# Patient Record
Sex: FEMALE | Race: BLACK OR AFRICAN AMERICAN | NOT HISPANIC OR LATINO | Employment: FULL TIME | URBAN - METROPOLITAN AREA
[De-identification: names, ages, dates, MRNs, and addresses within clinical notes are randomized per-mention and may not be internally consistent; named-entity substitution may affect disease eponyms.]

---

## 2023-05-20 LAB — CHORIOGONADOTROPIN (MIU/ML) IN SER/PLAS: 2845 IU/L

## 2023-05-26 ENCOUNTER — OFFICE VISIT (OUTPATIENT)
Dept: PRIMARY CARE | Facility: CLINIC | Age: 28
End: 2023-05-26
Payer: COMMERCIAL

## 2023-05-26 ENCOUNTER — LAB (OUTPATIENT)
Dept: LAB | Facility: LAB | Age: 28
End: 2023-05-26
Payer: COMMERCIAL

## 2023-05-26 VITALS
WEIGHT: 222.8 LBS | BODY MASS INDEX: 39.47 KG/M2 | OXYGEN SATURATION: 100 % | TEMPERATURE: 98 F | SYSTOLIC BLOOD PRESSURE: 140 MMHG | DIASTOLIC BLOOD PRESSURE: 90 MMHG | HEART RATE: 76 BPM

## 2023-05-26 DIAGNOSIS — R76.11 POSITIVE TB TEST: ICD-10-CM

## 2023-05-26 DIAGNOSIS — Z3A.01 LESS THAN 8 WEEKS GESTATION OF PREGNANCY (HHS-HCC): Primary | ICD-10-CM

## 2023-05-26 PROCEDURE — 86481 TB AG RESPONSE T-CELL SUSP: CPT

## 2023-05-26 PROCEDURE — 99213 OFFICE O/P EST LOW 20 MIN: CPT | Performed by: STUDENT IN AN ORGANIZED HEALTH CARE EDUCATION/TRAINING PROGRAM

## 2023-05-26 PROCEDURE — 36415 COLL VENOUS BLD VENIPUNCTURE: CPT

## 2023-05-26 PROCEDURE — 1036F TOBACCO NON-USER: CPT | Performed by: STUDENT IN AN ORGANIZED HEALTH CARE EDUCATION/TRAINING PROGRAM

## 2023-05-26 ASSESSMENT — PAIN SCALES - GENERAL: PAINLEVEL: 0-NO PAIN

## 2023-05-26 NOTE — PROGRESS NOTES
Subjective   Patient ID: Vita Oconnor is a 27 y.o. female who presents for Routine Prenatal Visit and Follow-up (Pt states she tested positive for TB on May 5th and was told she needed a chest xray to return to work.).    HPI     #Pregnancy  -From OB note:    HPI:   : 26 yo  presents with spotting and cramping. Hcg 833. TVUS with likely GS. Repeat quat in 48hrs.     Blood Type: O pos; Rhogam not indcated     Ultrasound:   IMPRESSION:  1. Intrauterine probable gestational sac, with measurements  corresponding to a 5 week gestation, without fetal pole or yolk sac  evident. Serial serum beta HCG and follow up with pelvic ultrasound  is recommended.  2. Right-sided corpus luteum cyst. Left-sided simple cysts.    Impression: Likely early IUP     Contraceptive plan: unk     HCG Levels:   Date HCG Plan    hcg 833 repeat in 48 hours   [address] [facility] US vs     Contact Details:   Date Details   : Added to excel spreadsheet. -LM  : Pt doing well. Asx. Will get quant tomorrow. Undesired pregnancy, plans to go to  if quant rising appropriately. - LM  : Pt doing well. Asx. Not bleeding. Still wants to go to . RN tasked to debra'd US. Instructed pt that if she has US at  before US at , she wouldn't need both. F/u US debra/plan . -LM  : Patient scheduled for U/S,  at [id number]. SEBASTIÁN Tate RN    -B quant increasing   -Pt states that she is planning to go through  for elective termination, is connected with resources, [place] for CXR    #TB testing  - States she was applying for a job and had a positive TB blood test, has a prior history of + PPD test, was never treated, never had a chest xray,  -Pt denies any productive cough or night sweats, denies recent travel, has never gone to intermediate, denies IVDU, denies illnesses or immunocompromised state        Review of Systems    Pt denies any current chest pain, SOB, nausea or abdominal pain    Objective   /90 (BP Location:  Right arm, Patient Position: Sitting, BP Cuff Size: Adult)   Pulse 76   Temp 36.7 °C (98 °F) (Temporal)   Wt 101 kg (222 lb 12.8 oz)   SpO2 100%   BMI 39.47 kg/m²     Physical Exam    Constitutional: Well developed, awake, alert, oriented x3  Head and Face: NCAT  Eyes: Normal external exam, clear sclera bl  ENT: Normal external inspection of ears and nose. Oropharynx normal.  Cardiovascular: RRR, S1/S2, no murmurs, rubs, or gallops, radial pulses +2, no edema of extremities  Pulmonary: CTAB, no respiratory distress.  Abdomen: +BS, soft, non-tender, nondistended, no guarding or rebound, no masses noted  Neuro: A&O x3, CN II-XII grossly intact, no focal neuro deficits   MSK: No joint swelling, normal movements of all extremities. Range of motion- normal.  Skin- No lesions, contusions, or erythema.  Psychiatric: Judgment intact. Appropriate mood and behavior     Assessment/Plan   Diagnoses and all orders for this visit:  Less than 8 weeks gestation of pregnancy  -     prenatal vitamin iron-folic (Prenatal) 27 mg iron- 800 mcg tablet; Take 1 tablet by mouth once daily.  -Pt current plan is for elective termination, is connected with  group, will prescribed prenatals every day until termination complete or pt changes her mind, pt agreeable to plan  Positive TB test  -     T-Spot TB; Future  -     Referral to Infectious Disease; Future  -Unclear which TB test positive since none available in EMR, will repeat test and refer to ID for treatment/workup given concurrent pregnancy    Discussed with:  Dr. Kirby  Return in : 1-2 months for FUV    Portions of this note were generated using digital voice recognition software, and may contain grammatical errors       Solo Jaeger MD  PGY-3, Family Medicine

## 2023-05-30 LAB
NIL(NEG) CONTROL SPOT COUNT: ABNORMAL
PANEL A SPOT COUNT: 3
PANEL B SPOT COUNT: 10
POS CONTROL SPOT COUNT: ABNORMAL
T-SPOT. TB INTERPRETATION: ABNORMAL

## 2023-06-02 NOTE — PROGRESS NOTES
I reviewed with the resident the medical history and the resident’s findings on physical examination.  I discussed with the resident the patient’s diagnosis and concur with the treatment plan as documented in the resident note.     Follow-up asap if blood test results positive.  Mildred Kirby MD

## 2023-06-10 DIAGNOSIS — Z3A.01 LESS THAN 8 WEEKS GESTATION OF PREGNANCY (HHS-HCC): ICD-10-CM

## 2023-06-12 RX ORDER — B-COMPLEX WITH VITAMIN C
TABLET ORAL
Qty: 90 TABLET | Refills: 1 | Status: SHIPPED | OUTPATIENT
Start: 2023-06-12 | End: 2023-09-16 | Stop reason: ALTCHOICE

## 2023-06-16 ENCOUNTER — APPOINTMENT (OUTPATIENT)
Dept: PRIMARY CARE | Facility: CLINIC | Age: 28
End: 2023-06-16
Payer: COMMERCIAL

## 2023-07-29 ENCOUNTER — HOSPITAL ENCOUNTER (OUTPATIENT)
Dept: DATA CONVERSION | Facility: HOSPITAL | Age: 28
End: 2023-07-29

## 2023-09-07 LAB
THYROTROPIN (MIU/L) IN SER/PLAS BY DETECTION LIMIT <= 0.05 MIU/L: 0.21 MIU/L (ref 0.44–3.98)
THYROXINE (T4) FREE (NG/DL) IN SER/PLAS: 1.17 NG/DL (ref 0.78–1.48)

## 2023-09-08 ENCOUNTER — OFFICE VISIT (OUTPATIENT)
Dept: PRIMARY CARE | Facility: CLINIC | Age: 28
End: 2023-09-08
Payer: COMMERCIAL

## 2023-09-08 VITALS
BODY MASS INDEX: 35.82 KG/M2 | DIASTOLIC BLOOD PRESSURE: 100 MMHG | WEIGHT: 215 LBS | SYSTOLIC BLOOD PRESSURE: 138 MMHG | HEIGHT: 65 IN | HEART RATE: 74 BPM

## 2023-09-08 DIAGNOSIS — Z76.89 ESTABLISHING CARE WITH NEW DOCTOR, ENCOUNTER FOR: Primary | ICD-10-CM

## 2023-09-08 DIAGNOSIS — R01.1 MURMUR, HEART: ICD-10-CM

## 2023-09-08 DIAGNOSIS — I10 HYPERTENSION, UNSPECIFIED TYPE: ICD-10-CM

## 2023-09-08 DIAGNOSIS — F41.9 ANXIETY: ICD-10-CM

## 2023-09-08 LAB
ALANINE AMINOTRANSFERASE (SGPT) (U/L) IN SER/PLAS: 7 U/L (ref 7–45)
ALBUMIN (G/DL) IN SER/PLAS: 4.1 G/DL (ref 3.4–5)
ALKALINE PHOSPHATASE (U/L) IN SER/PLAS: 72 U/L (ref 33–110)
ANION GAP IN SER/PLAS: 14 MMOL/L (ref 10–20)
ASPARTATE AMINOTRANSFERASE (SGOT) (U/L) IN SER/PLAS: 9 U/L (ref 9–39)
BILIRUBIN TOTAL (MG/DL) IN SER/PLAS: 1.3 MG/DL (ref 0–1.2)
CALCIUM (MG/DL) IN SER/PLAS: 9.5 MG/DL (ref 8.6–10.6)
CARBON DIOXIDE, TOTAL (MMOL/L) IN SER/PLAS: 27 MMOL/L (ref 21–32)
CHLORIDE (MMOL/L) IN SER/PLAS: 104 MMOL/L (ref 98–107)
CHOLESTEROL (MG/DL) IN SER/PLAS: 129 MG/DL (ref 0–199)
CHOLESTEROL IN HDL (MG/DL) IN SER/PLAS: 39.6 MG/DL
CHOLESTEROL/HDL RATIO: 3.3
CREATININE (MG/DL) IN SER/PLAS: 0.74 MG/DL (ref 0.5–1.05)
ERYTHROCYTE DISTRIBUTION WIDTH (RATIO) BY AUTOMATED COUNT: 14 % (ref 11.5–14.5)
ERYTHROCYTE MEAN CORPUSCULAR HEMOGLOBIN CONCENTRATION (G/DL) BY AUTOMATED: 31.4 G/DL (ref 32–36)
ERYTHROCYTE MEAN CORPUSCULAR VOLUME (FL) BY AUTOMATED COUNT: 92 FL (ref 80–100)
ERYTHROCYTES (10*6/UL) IN BLOOD BY AUTOMATED COUNT: 4.43 X10E12/L (ref 4–5.2)
GFR FEMALE: >90 ML/MIN/1.73M2
GLUCOSE (MG/DL) IN SER/PLAS: 85 MG/DL (ref 74–99)
HEMATOCRIT (%) IN BLOOD BY AUTOMATED COUNT: 40.7 % (ref 36–46)
HEMOGLOBIN (G/DL) IN BLOOD: 12.8 G/DL (ref 12–16)
LDL: 79 MG/DL (ref 0–99)
LEUKOCYTES (10*3/UL) IN BLOOD BY AUTOMATED COUNT: 8.2 X10E9/L (ref 4.4–11.3)
NRBC (PER 100 WBCS) BY AUTOMATED COUNT: 0 /100 WBC (ref 0–0)
PLATELETS (10*3/UL) IN BLOOD AUTOMATED COUNT: 351 X10E9/L (ref 150–450)
POTASSIUM (MMOL/L) IN SER/PLAS: 3.5 MMOL/L (ref 3.5–5.3)
PROTEIN TOTAL: 7 G/DL (ref 6.4–8.2)
SODIUM (MMOL/L) IN SER/PLAS: 141 MMOL/L (ref 136–145)
TRIGLYCERIDE (MG/DL) IN SER/PLAS: 54 MG/DL (ref 0–149)
UREA NITROGEN (MG/DL) IN SER/PLAS: 5 MG/DL (ref 6–23)
VLDL: 11 MG/DL (ref 0–40)

## 2023-09-08 PROCEDURE — 3075F SYST BP GE 130 - 139MM HG: CPT

## 2023-09-08 PROCEDURE — 80061 LIPID PANEL: CPT

## 2023-09-08 PROCEDURE — 85027 COMPLETE CBC AUTOMATED: CPT

## 2023-09-08 PROCEDURE — 80053 COMPREHEN METABOLIC PANEL: CPT

## 2023-09-08 PROCEDURE — 1036F TOBACCO NON-USER: CPT

## 2023-09-08 PROCEDURE — 99204 OFFICE O/P NEW MOD 45 MIN: CPT

## 2023-09-08 PROCEDURE — 3080F DIAST BP >= 90 MM HG: CPT

## 2023-09-08 RX ORDER — AMLODIPINE BESYLATE 5 MG/1
5 TABLET ORAL DAILY
Qty: 30 TABLET | Refills: 1 | Status: SHIPPED | OUTPATIENT
Start: 2023-09-08 | End: 2023-10-18 | Stop reason: SDUPTHER

## 2023-09-08 NOTE — PATIENT INSTRUCTIONS
Please start taking your Paroxetine 10 mg for anxiety and depression.   Cut your trazodone 50 mg in half if they are making you groggy and try that for insomnia.  For blood pressure we started you on a medication called amlodipine 5 mg.  You will need to take this medication once daily.  Please take your blood pressure in the morning and keep a log until you see us.  An order for an echocardiogram was placed for this new murmur  Please get this completed before your next appointment.    You will need to follow up with us at the end of September for a follow up appointment.  If you notice any worsening signs or symptoms or have any thoughts of suicide or homicide please go to the nearest emergency department.

## 2023-09-08 NOTE — PROGRESS NOTES
"Subjective   Patient ID: Vita Oconnor is a 28 y.o. female who presents for Establish Care. Patient stated that in July she had an  then she stated that she immediately started having signs and symptoms of anxiety and depression such as insomnia, loss of interest, excessive worrying, palpitations, and fatigue. Patient stated that she was started on paroxetine and trazodone. She took trazodone but did not like the way it made her feel. She stated that she has not started her paroxetine. She also wanted a referral to a new therapist preferably a woman. Patient also stated that recently her blood pressure has been high and she has been having headaches and feeling lightheaded. Patient also stated that she went to the emergency department yesterday and she was told the she has a murmur and she was never told that before.      Review of Systems  All other systems have been reviewed and are negative.    Visit Vitals  BP (!) 138/100   Pulse 74   Ht 1.651 m (5' 5\")   Wt 97.5 kg (215 lb)   BMI 35.78 kg/m²   OB Status Pregnant   Smoking Status Never   BSA 2.11 m²       Objective   Physical Exam  General: Alert and oriented. Appears well-nourished and in no acute distress.  Eyes: PERRLA. EOMI.  Head/neck: Normocephalic. Supple.  Lymphatics: No cervical lymphadenopathy.  Respiratory/Thorax: Clear to auscultation bilaterally. No wheezing.   Cardiovascular: Regular rate and rhythm. No murmurs.  Gastrointestinal: Soft, nontender, nondistended. +BS   Musculoskeletal: ROM intact. No joint swelling. Normal strength   Extremities: Warm and well perfused. No peripheral edema.  Neurological: No gross neurologic deficits.   Psychological: Appropriate mood and affect.   Skin: No visible rashes or lesions.     Assessment/Plan   Vita was seen today for establish care.  Diagnoses and all orders for this visit:  Establishing care with new doctor, encounter for  -     Lipid Panel; Future  -     CBC; Future  -     Comprehensive Metabolic " Panel; Future  -     Comprehensive Metabolic Panel  -     CBC  -     Lipid Panel  Anxiety  -     Referral to Psychology; Future  Postpartum depression  -     Referral to Psychology; Future  Murmur, heart  -     Transthoracic Echo (TTE) Complete; Future  Hypertension, unspecified type  -     amLODIPine (Norvasc) 5 mg tablet; Take 1 tablet (5 mg) by mouth once daily.  Pt is a 28 yo F who was seen today for post partum anxiety and depression. Patient was encouraged to started her anti-depressants and try to take a half of the trazodone which is 25 mg to see if she is able to tolerate it for insomnia. Patient was given a referral for psychology and a list of therapists. Patient was also started on amlodipine 5 mg for HTN. An echocardiogram was ordered for the new murmur.    NATIVIDAD-7: 14- Moderate Anxiety  PHQ-9: 13 -Moderate depression      No red flags. Follow up in one month for anxiety and depression and htn    Problem List Items Addressed This Visit    None  Visit Diagnoses       Establishing care with new doctor, encounter for    -  Primary    Relevant Orders    Lipid Panel (Completed)    CBC (Completed)    Comprehensive Metabolic Panel (Completed)    Anxiety        Relevant Orders    Referral to Psychology    Postpartum depression        Relevant Orders    Referral to Psychology    Murmur, heart        Relevant Orders    Transthoracic Echo (TTE) Complete    Hypertension, unspecified type        Relevant Medications    amLODIPine (Norvasc) 5 mg tablet            I have personally reviewed all available pertinent labs, imaging, and consult notes with the patient.     All questions and concerns were addressed. Patient verbalizes understanding instructions and agrees with established plan of care.     I discussed the plan with Dr.Zen Jessica Rodriguez DO, PGY-2  [facility] Family Medicine

## 2023-09-11 ENCOUNTER — TELEPHONE (OUTPATIENT)
Dept: PRIMARY CARE | Facility: CLINIC | Age: 28
End: 2023-09-11
Payer: COMMERCIAL

## 2023-09-16 NOTE — ADDENDUM NOTE
Addended by: ANTHONY MARKS on: 9/16/2023 12:08 AM     Modules accepted: Orders, Level of Service

## 2023-09-21 PROBLEM — L91.0 HYPERTROPHIC SCAR: Status: ACTIVE | Noted: 2019-03-27

## 2023-09-21 PROBLEM — L70.0 ACNE VULGARIS: Status: ACTIVE | Noted: 2019-03-27

## 2023-09-21 PROBLEM — L21.8 OTHER SEBORRHEIC DERMATITIS: Status: ACTIVE | Noted: 2019-03-27

## 2023-09-21 PROBLEM — L81.1 CHLOASMA: Status: ACTIVE | Noted: 2019-03-27

## 2023-09-21 RX ORDER — NORETHINDRONE ACETATE AND ETHINYL ESTRADIOL AND FERROUS FUMARATE 1MG-20(21)
1 KIT ORAL DAILY
COMMUNITY

## 2023-09-21 RX ORDER — CLOBETASOL PROPIONATE 0.5 MG/G
CREAM TOPICAL
COMMUNITY
Start: 2017-06-16

## 2023-09-21 RX ORDER — LIDOCAINE 560 MG/1
PATCH PERCUTANEOUS; TOPICAL; TRANSDERMAL DAILY
COMMUNITY
Start: 2023-02-03 | End: 2024-05-09 | Stop reason: WASHOUT

## 2023-09-21 RX ORDER — KETOCONAZOLE 20 MG/G
CREAM TOPICAL
COMMUNITY
Start: 2019-03-27 | End: 2024-05-09 | Stop reason: WASHOUT

## 2023-09-21 RX ORDER — SERTRALINE HYDROCHLORIDE 25 MG/1
25 TABLET, FILM COATED ORAL
COMMUNITY
Start: 2023-08-29 | End: 2024-03-05 | Stop reason: WASHOUT

## 2023-09-21 RX ORDER — RIFAPENTINE 150 MG/1
TABLET, FILM COATED ORAL
COMMUNITY
Start: 2023-07-05 | End: 2023-10-18 | Stop reason: SDUPTHER

## 2023-09-21 RX ORDER — TRAZODONE HYDROCHLORIDE 50 MG/1
1 TABLET ORAL NIGHTLY
COMMUNITY
Start: 2023-08-29 | End: 2024-03-05 | Stop reason: WASHOUT

## 2023-09-21 RX ORDER — PAROXETINE 10 MG/1
10 TABLET, FILM COATED ORAL DAILY
COMMUNITY
Start: 2023-09-07 | End: 2023-12-12

## 2023-09-21 RX ORDER — IBUPROFEN 600 MG/1
TABLET ORAL EVERY 6 HOURS
COMMUNITY
Start: 2022-12-29

## 2023-09-21 RX ORDER — ISONIAZID 300 MG/1
TABLET ORAL
COMMUNITY
Start: 2023-07-05 | End: 2023-10-18 | Stop reason: SDUPTHER

## 2023-09-21 RX ORDER — FAMOTIDINE 40 MG/5ML
POWDER, FOR SUSPENSION ORAL
COMMUNITY
Start: 2023-09-08 | End: 2024-03-05 | Stop reason: WASHOUT

## 2023-09-21 RX ORDER — CYCLOBENZAPRINE HCL 10 MG
10 TABLET ORAL 3 TIMES DAILY
COMMUNITY
Start: 2023-02-03 | End: 2024-03-05 | Stop reason: ALTCHOICE

## 2023-09-28 NOTE — PROGRESS NOTES
Chief Complaint Vita Oconnor is a 28 y.o. female who presents for No chief complaint on file.       HPI:    ROS:  Review of Systems    PMH:  Past Medical History:   Diagnosis Date    Chlamydial infection, unspecified     Chlamydia    Encounter for supervision of normal first pregnancy, unspecified trimester 05/23/2014    Primigravida    Encounter for supervision of other normal pregnancy, unspecified trimester 09/18/2014    Pregnancy, subsequent    Other conditions influencing health status     H/O pregnancy    Other specified health status     Last menstrual period (LMP) > 10 days ago    Other stressful life events affecting family and household     Teenage parent    Personal history of other endocrine, nutritional and metabolic disease     History of obesity    Type O blood, Rh positive     Blood type O+      There is no height or weight on file to calculate BMI.    PSH/Trauma:  No past surgical history on file.      Meds:    Current Outpatient Medications:     amLODIPine (Norvasc) 5 mg tablet, Take 1 tablet (5 mg) by mouth once daily., Disp: 30 tablet, Rfl: 1    clobetasol (Temovate) 0.05 % cream, 1 Application, Disp: , Rfl:     cyclobenzaprine (Flexeril) 10 mg tablet, Take 1 tablet (10 mg) by mouth 3 times a day., Disp: , Rfl:     famotidine (Pepcid) 40 mg/5 mL (8 mg/mL) suspension, , Disp: , Rfl:     ibuprofen 600 mg tablet, Take by mouth every 6 hours., Disp: , Rfl:     isoniazid (Nydrazid) 300 mg tablet, , Disp: , Rfl:     Junel FE 1/20, 28, 1 mg-20 mcg (21)/75 mg (7) tablet, Take 1 tablet by mouth once daily. as directed, Disp: , Rfl:     ketoconazole (NIZOral) 2 % cream, 1 Application, Disp: , Rfl:     lidocaine 4 % patch, once daily., Disp: , Rfl:     PARoxetine (Paxil) 10 mg tablet, Take 1 tablet (10 mg) by mouth once daily., Disp: , Rfl:     Priftin 150 mg tablet, , Disp: , Rfl:     sertraline (Zoloft) 25 mg tablet, Take 1 tablet (25 mg) by mouth once daily., Disp: , Rfl:     traZODone (Desyrel) 50 mg  tablet, Take 1 tablet (50 mg) by mouth once daily at bedtime., Disp: , Rfl:     Allergies:  Allergies   Allergen Reactions    Amoxicillin Unknown       SH:  Alcohol Use: Not on file     Physical Activity: Not on file     Social Determinants of Health     Intimate Partner Violence: Not on file   Social Connections: Not on file   Alcohol Use: Not on file   Tobacco Use: Low Risk  (9/21/2023)    Patient History     Smoking Tobacco Use: Never     Smokeless Tobacco Use: Never     Passive Exposure: Not on file   Financial Resource Strain: Not on file   Depression: Not on file   Postpartum Depression: Not on file   Stress: Not on file   Physical Activity: Not on file   Food Insecurity: Not on file   Transportation Needs: Not on file       FH:  Family History   Problem Relation Name Age of Onset    No Known Problems Mother      No Known Problems Father          Preventatives:  Colonoscopy  Mammogram  Papsmear  AAA screening  Lung CA screening  ASCVD risk score The ASCVD Risk score (Elicia LEIVA, et al., 2019) failed to calculate for the following reasons:    The 2019 ASCVD risk score is only valid for ages 40 to 79    PE:  Visit Vitals  OB Status Pregnant   Smoking Status Never     Physical Exam      Assessment/Plan  Assessment/Plan       Follow up in...      Patient was staffed with Dr. Jessica Rodriguez DO, PGY-2  [facility] Family Medicine

## 2023-09-29 ENCOUNTER — APPOINTMENT (OUTPATIENT)
Dept: PRIMARY CARE | Facility: CLINIC | Age: 28
End: 2023-09-29
Payer: COMMERCIAL

## 2023-10-03 ENCOUNTER — APPOINTMENT (OUTPATIENT)
Dept: PRIMARY CARE | Facility: CLINIC | Age: 28
End: 2023-10-03
Payer: COMMERCIAL

## 2023-10-18 ENCOUNTER — OFFICE VISIT (OUTPATIENT)
Dept: PRIMARY CARE | Facility: CLINIC | Age: 28
End: 2023-10-18
Payer: COMMERCIAL

## 2023-10-18 VITALS
BODY MASS INDEX: 36.15 KG/M2 | SYSTOLIC BLOOD PRESSURE: 123 MMHG | RESPIRATION RATE: 18 BRPM | HEART RATE: 87 BPM | HEIGHT: 65 IN | TEMPERATURE: 97.8 F | DIASTOLIC BLOOD PRESSURE: 85 MMHG | OXYGEN SATURATION: 100 % | WEIGHT: 217 LBS

## 2023-10-18 DIAGNOSIS — I10 HYPERTENSION, UNSPECIFIED TYPE: ICD-10-CM

## 2023-10-18 DIAGNOSIS — N92.6 MISSED MENSES: ICD-10-CM

## 2023-10-18 DIAGNOSIS — R76.11 POSITIVE TUBERCULIN TEST: Primary | ICD-10-CM

## 2023-10-18 PROCEDURE — 1036F TOBACCO NON-USER: CPT | Performed by: NURSE PRACTITIONER

## 2023-10-18 PROCEDURE — 99213 OFFICE O/P EST LOW 20 MIN: CPT | Performed by: NURSE PRACTITIONER

## 2023-10-18 PROCEDURE — 3074F SYST BP LT 130 MM HG: CPT | Performed by: NURSE PRACTITIONER

## 2023-10-18 PROCEDURE — 3079F DIAST BP 80-89 MM HG: CPT | Performed by: NURSE PRACTITIONER

## 2023-10-18 RX ORDER — AMLODIPINE BESYLATE 5 MG/1
5 TABLET ORAL DAILY
Qty: 30 TABLET | Refills: 2 | Status: SHIPPED | OUTPATIENT
Start: 2023-10-18 | End: 2023-12-28

## 2023-10-18 RX ORDER — ISONIAZID 300 MG/1
900 TABLET ORAL
Qty: 36 TABLET | Refills: 0 | Status: SHIPPED | OUTPATIENT
Start: 2023-10-18 | End: 2024-03-05 | Stop reason: SDUPTHER

## 2023-10-18 RX ORDER — RIFAPENTINE 150 MG/1
TABLET, FILM COATED ORAL
Qty: 72 TABLET | Refills: 0 | Status: SHIPPED | OUTPATIENT
Start: 2023-10-18 | End: 2024-03-05 | Stop reason: SDUPTHER

## 2023-10-18 ASSESSMENT — ENCOUNTER SYMPTOMS
DEPRESSION: 0
OCCASIONAL FEELINGS OF UNSTEADINESS: 0
LOSS OF SENSATION IN FEET: 0

## 2023-10-18 ASSESSMENT — PATIENT HEALTH QUESTIONNAIRE - PHQ9
2. FEELING DOWN, DEPRESSED OR HOPELESS: NOT AT ALL
SUM OF ALL RESPONSES TO PHQ9 QUESTIONS 1 AND 2: 0
1. LITTLE INTEREST OR PLEASURE IN DOING THINGS: NOT AT ALL

## 2023-10-18 ASSESSMENT — PAIN SCALES - GENERAL: PAINLEVEL: 0-NO PAIN

## 2023-10-18 ASSESSMENT — COLUMBIA-SUICIDE SEVERITY RATING SCALE - C-SSRS
1. IN THE PAST MONTH, HAVE YOU WISHED YOU WERE DEAD OR WISHED YOU COULD GO TO SLEEP AND NOT WAKE UP?: NO
2. HAVE YOU ACTUALLY HAD ANY THOUGHTS OF KILLING YOURSELF?: NO
6. HAVE YOU EVER DONE ANYTHING, STARTED TO DO ANYTHING, OR PREPARED TO DO ANYTHING TO END YOUR LIFE?: NO

## 2023-10-18 NOTE — PATIENT INSTRUCTIONS
Follow up with your PCP for further management.    Keep up the great work with recent blood pressure monitoring and control  For your blood pressure:  Take your medication as directed. Try to take it around the same time daily.   Keep a log of your blood pressure. Be sure to bring it with you to your next appointment so we can review it together.  Adhere to the DASH diet. This includes decreasing your salt/sodium intake. Avoid canned foods, lunch meats, and frozen foods.  Exercise for 30 minutes daily.  Continue amlodipine.    Take the medications, as prescribed, once weekly for TB.

## 2023-10-18 NOTE — PROGRESS NOTES
"Subjective   Vita Oconnor is a 28 y.o. female who presents for NPV  HPI  She has plans to continue routine follow up with PCP in [place]    States that she was recently seen to establish primary care in [place] and that she was started on amlodipine for blood pressure management. She is requesting a refill of this medication today. States that she is here because she was unable to get a sooner follow up with her own PCP. Denies headache, chest pain, palpitations, blurred vision, or dizziness    Patient was seen through [facility] for treatment of latent TB. States that she then found out that she was pregnant and subsequently threw the medication away.         All systems reviewed. Review of systems negative except for noted positives in HPI    Objective     /85 (BP Location: Right arm, Patient Position: Sitting, BP Cuff Size: Adult long)   Pulse 87   Temp 36.6 °C (97.8 °F)   Resp 18   Ht 1.651 m (5' 5\")   Wt 98.4 kg (217 lb)   LMP 10/12/2023   SpO2 100%   Breastfeeding Unknown   BMI 36.11 kg/m²    Vital signs noted and reviewed.       Physical Exam  Constitutional:       Appearance: Normal appearance.   Cardiovascular:      Rate and Rhythm: Normal rate and regular rhythm.   Pulmonary:      Effort: Pulmonary effort is normal. No respiratory distress.      Breath sounds: Normal breath sounds.   Skin:     General: Skin is warm and dry.   Neurological:      Mental Status: She is oriented to person, place, and time.   Psychiatric:         Mood and Affect: Mood normal.             Assessment/Plan   Problem List Items Addressed This Visit    None  Visit Diagnoses       Positive tuberculin test    -  Primary    Relevant Medications    isoniazid (Nydrazid) 300 mg tablet    Priftin 150 mg tablet    Hypertension, unspecified type        Relevant Medications    amLODIPine (Norvasc) 5 mg tablet    Missed menses        Relevant Orders    POCT Pregnancy, urine manually resulted                    "

## 2023-12-08 DIAGNOSIS — F41.9 ANXIETY: ICD-10-CM

## 2023-12-12 RX ORDER — PAROXETINE 10 MG/1
10 TABLET, FILM COATED ORAL DAILY
Qty: 30 TABLET | Refills: 1 | Status: SHIPPED | OUTPATIENT
Start: 2023-12-12 | End: 2024-03-05 | Stop reason: SDUPTHER

## 2023-12-26 ENCOUNTER — ANCILLARY PROCEDURE (OUTPATIENT)
Dept: EMERGENCY MEDICINE | Facility: HOSPITAL | Age: 28
End: 2023-12-26
Payer: COMMERCIAL

## 2023-12-26 ENCOUNTER — HOSPITAL ENCOUNTER (EMERGENCY)
Facility: HOSPITAL | Age: 28
Discharge: HOME | End: 2023-12-27
Payer: COMMERCIAL

## 2023-12-26 VITALS
TEMPERATURE: 97.2 F | DIASTOLIC BLOOD PRESSURE: 95 MMHG | BODY MASS INDEX: 36.15 KG/M2 | SYSTOLIC BLOOD PRESSURE: 150 MMHG | RESPIRATION RATE: 16 BRPM | HEIGHT: 65 IN | WEIGHT: 217 LBS | HEART RATE: 87 BPM | OXYGEN SATURATION: 98 %

## 2023-12-26 DIAGNOSIS — F41.9 ANXIETY: ICD-10-CM

## 2023-12-26 DIAGNOSIS — G44.209 TENSION HEADACHE: Primary | ICD-10-CM

## 2023-12-26 DIAGNOSIS — R07.9 CHEST PAIN, UNSPECIFIED TYPE: ICD-10-CM

## 2023-12-26 DIAGNOSIS — I10 HYPERTENSION, UNSPECIFIED TYPE: ICD-10-CM

## 2023-12-26 LAB
ATRIAL RATE: 77 BPM
P AXIS: 31 DEGREES
P OFFSET: 199 MS
P ONSET: 145 MS
PR INTERVAL: 152 MS
Q ONSET: 221 MS
QRS COUNT: 12 BEATS
QRS DURATION: 80 MS
QT INTERVAL: 366 MS
QTC CALCULATION(BAZETT): 414 MS
QTC FREDERICIA: 397 MS
R AXIS: 49 DEGREES
T AXIS: 32 DEGREES
T OFFSET: 404 MS
VENTRICULAR RATE: 77 BPM

## 2023-12-26 PROCEDURE — 99283 EMERGENCY DEPT VISIT LOW MDM: CPT | Mod: 25

## 2023-12-26 PROCEDURE — 93010 ELECTROCARDIOGRAM REPORT: CPT | Performed by: NURSE PRACTITIONER

## 2023-12-26 PROCEDURE — 99284 EMERGENCY DEPT VISIT MOD MDM: CPT | Performed by: NURSE PRACTITIONER

## 2023-12-26 PROCEDURE — 96372 THER/PROPH/DIAG INJ SC/IM: CPT

## 2023-12-26 PROCEDURE — 93005 ELECTROCARDIOGRAM TRACING: CPT

## 2023-12-26 PROCEDURE — 2500000004 HC RX 250 GENERAL PHARMACY W/ HCPCS (ALT 636 FOR OP/ED): Mod: SE | Performed by: NURSE PRACTITIONER

## 2023-12-26 PROCEDURE — 99284 EMERGENCY DEPT VISIT MOD MDM: CPT

## 2023-12-26 RX ORDER — KETOROLAC TROMETHAMINE 30 MG/ML
30 INJECTION, SOLUTION INTRAMUSCULAR; INTRAVENOUS ONCE
Status: COMPLETED | OUTPATIENT
Start: 2023-12-26 | End: 2023-12-26

## 2023-12-26 RX ADMIN — KETOROLAC TROMETHAMINE 30 MG: 30 INJECTION, SOLUTION INTRAMUSCULAR; INTRAVENOUS at 23:47

## 2023-12-26 ASSESSMENT — PAIN SCALES - GENERAL
PAINLEVEL_OUTOF10: 8
PAINLEVEL_OUTOF10: 5 - MODERATE PAIN

## 2023-12-26 ASSESSMENT — PAIN - FUNCTIONAL ASSESSMENT
PAIN_FUNCTIONAL_ASSESSMENT: 0-10
PAIN_FUNCTIONAL_ASSESSMENT: 0-10

## 2023-12-26 ASSESSMENT — PAIN DESCRIPTION - LOCATION: LOCATION: HEAD

## 2023-12-26 ASSESSMENT — COLUMBIA-SUICIDE SEVERITY RATING SCALE - C-SSRS
6. HAVE YOU EVER DONE ANYTHING, STARTED TO DO ANYTHING, OR PREPARED TO DO ANYTHING TO END YOUR LIFE?: NO
1. IN THE PAST MONTH, HAVE YOU WISHED YOU WERE DEAD OR WISHED YOU COULD GO TO SLEEP AND NOT WAKE UP?: NO
2. HAVE YOU ACTUALLY HAD ANY THOUGHTS OF KILLING YOURSELF?: NO

## 2023-12-26 ASSESSMENT — PAIN DESCRIPTION - DESCRIPTORS: DESCRIPTORS: ACHING

## 2023-12-27 NOTE — ED PROVIDER NOTES
"Emergency Department Encounter  [facility] EMERGENCY MEDICINE    Patient: Vita Oconnor  MRN: [id number]  : 1995  Date of Evaluation: 2023  ED Provider: PENELOPE Carcamo      Chief Complaint       Chief Complaint   Patient presents with    Flu Symptoms     Hoh    (Location/Symptom, Timing/Onset, Context/Setting, Quality, Duration, Modifying Factors, Severity) Note limiting factors.   Limitations to History: none  Historian: self  Records reviewed: EMR inpatient and outpatient notes, Care Everywhere      Vita Oconnor is a 28 y.o. female who presents to the emergency department complaining of anxiety, chest pressure, \"body on fire\" paresthesias to bilateral hands ongoing for the last 3 days.  Does report that she stopped taking her anxiety medication 1 month ago and just refilled it today.  Also with headache, no blurry vision.  Headache is across the frontal area of her head, denies any photophobia, neck pain, fevers, chills.  Denies any cough, abdominal pain, nausea, vomiting.  States that she has had issues with anxiety and feels like this might be her anxiety being triggered.  States that she has had issues since she had an  back in July.  Denies any suicidal ideation, homicidal ideation, hallucinations.  Denies any sore throat, nasal congestion.    ROS:     Review of Systems  14 systems reviewed and otherwise acutely negative except as in the Hoh.          Past History     Past Medical History:   Diagnosis Date    Chlamydial infection, unspecified     Chlamydia    Encounter for supervision of normal first pregnancy, unspecified trimester 2014    Primigravida    Encounter for supervision of other normal pregnancy, unspecified trimester 2014    Pregnancy, subsequent    Hypertension     Other conditions influencing health status     H/O pregnancy    Other specified health status     Last menstrual period (LMP) > 10 days ago    Other stressful life events " affecting family and household     Teenage parent    Personal history of other endocrine, nutritional and metabolic disease     History of obesity    Type O blood, Rh positive     Blood type O+     History reviewed. No pertinent surgical history.  Social History     Socioeconomic History    Marital status: Single     Spouse name: None    Number of children: None    Years of education: None    Highest education level: None   Occupational History    None   Tobacco Use    Smoking status: Never    Smokeless tobacco: Never   Substance and Sexual Activity    Alcohol use: Never    Drug use: Never    Sexual activity: None   Other Topics Concern    None   Social History Narrative    None     Social Determinants of Health     Financial Resource Strain: Not on file   Food Insecurity: Not on file   Transportation Needs: Not on file   Physical Activity: Not on file   Stress: Not on file   Social Connections: Not on file   Intimate Partner Violence: Not on file   Housing Stability: Not on file       Medications/Allergies     Previous Medications    AMLODIPINE (NORVASC) 5 MG TABLET    Take 1 tablet (5 mg) by mouth once daily.    CEFDINIR (OMNICEF) 250 MG/5 ML SUSPENSION    TAKE 6ML BY MOUTH TWICE A DAY    CIPRODEX OTIC SUSPENSION    4 DROP IN EACH AFFECTED EAR 2 TIMES A DAY    CLOBETASOL (TEMOVATE) 0.05 % CREAM    1 Application    CYCLOBENZAPRINE (FLEXERIL) 10 MG TABLET    Take 1 tablet (10 mg) by mouth 3 times a day.    FAMOTIDINE (PEPCID) 40 MG/5 ML (8 MG/ML) SUSPENSION        IBUPROFEN 600 MG TABLET    Take by mouth every 6 hours.    ISONIAZID (NYDRAZID) 300 MG TABLET    Take 3 tablets (900 mg) by mouth 1 (one) time per week.    JUNEL FE 1/20, 28, 1 MG-20 MCG (21)/75 MG (7) TABLET    Take 1 tablet by mouth once daily. as directed    KETOCONAZOLE (NIZORAL) 2 % CREAM    1 Application    LIDOCAINE 4 % PATCH    once daily.    PAROXETINE (PAXIL) 10 MG TABLET    TAKE 1 TABLET BY MOUTH EVERY DAY    PRIFTIN 150 MG TABLET    Take 6  tablets by mouth once weekly.    SERTRALINE (ZOLOFT) 25 MG TABLET    Take 1 tablet (25 mg) by mouth once daily.    TRAZODONE (DESYREL) 50 MG TABLET    Take 1 tablet (50 mg) by mouth once daily at bedtime.     Allergies   Allergen Reactions    Amoxicillin Unknown        Physical Exam       ED Triage Vitals [12/26/23 2207]   Temp Heart Rate Resp BP   36.2 °C (97.2 °F) 87 16 (!) 150/95      SpO2 Temp Source Heart Rate Source Patient Position   98 % Temporal -- --      BP Location FiO2 (%)     -- --         Physical Exam    GENERAL:  The patient appears nourished and normally developed. Vital signs as documented.     HEENT:  Head normocephalic, atraumatic, EOMs intact, PERRLA, Mucous membranes moist. Nares patent without copious rhinorrhea.  No lymphadenopathy.    PULMONARY:  Lungs are clear to auscultation, without any respiratory distress. Able to speak full sentences, no accessory muscle use    CARDIAC:   Normal rate. No murmurs, rubs or gallops    ABDOMEN:  Soft, non distended, non tender, BS positive x 4 quadrants, No rebound or guarding, no peritoneal signs, no CVA tenderness, no masses or organomegaly      MUSCULOSKELETAL:   Able to ambulate, Non edematous, with no obvious deformities. Pulses intact distal    SKIN:   Good color, with no significant rashes.  No pallor.    NEURO:  No obvious neurological deficits, normal sensation and strength bilaterally.  Able to follow commands, NIH 0, CN 2-12 intact.    Diagnostics   Labs:  Labs Reviewed - No data to display  Radiographs:  No orders to display         Assessment   In brief, Vita Oconnor is a 28 y.o. female who presented to the emergency department for chest pressure for the last 3 days, paresthesias to bilateral hands, paresthesias and burning sensation to the body.  Is afebrile with no upper respiratory symptoms.    Plan   EKG, Toradol    Differentials   Anxiety  ACS  PE  Viral illness    ED Course     Diagnoses as of 12/27/23 0031   Tension headache   Anxiety  "  Chest pain, unspecified type       Visit Vitals  BP (!) 150/95   Pulse 87   Temp 36.2 °C (97.2 °F) (Temporal)   Resp 16   Ht 1.651 m (5' 5\")   Wt 98.4 kg (217 lb)   SpO2 98%   BMI 36.11 kg/m²   OB Status Unknown   Smoking Status Never   BSA 2.12 m²       Medications   ketorolac (Toradol) injection 30 mg (30 mg intramuscular Given 12/26/23 2287)       Plan of care discussed, patient is stable appearing, neurologically intact, is afebrile, not tachycardic or hypoxic.  Chest pressure has been constant for the last 3 days and states that it feels similar to her anxiety in the past.  Picked up her anxiety medications today and has not initiated it.  EKG was performed in triage at 2216 showing rate of 77, normal sinus rhythm, no ST elevation, normal intervals, normal axis.  Patient was given Toradol for headache, was reevaluated and feels improved, will be discharged home in stable condition, educated on any worsening signs and symptoms to return to the emergency department      Final Impression      1. Tension headache    2. Anxiety    3. Chest pain, unspecified type          DISPOSITION  Disposition: Discharge  Patient condition is: Stable    Comment: Please note this report has been produced using speech recognition software and may contain errors related to that system including errors in grammar, punctuation, and spelling, as well as words and phrases that may be inappropriate.  If there are any questions or concerns please feel free to contact the dictating provider for clarification.    PENELOPE Carcamo APRN-CNP  12/27/23 0031    "

## 2023-12-27 NOTE — ED TRIAGE NOTES
Enters ED reporting HA, SOB, neuropathy to fingers bilaterally, muscle aches, and malaise. Endorses anxiety, non-compliant with medications. Denies sick contacts.

## 2023-12-28 RX ORDER — AMLODIPINE BESYLATE 5 MG/1
5 TABLET ORAL DAILY
Qty: 90 TABLET | Refills: 0 | Status: SHIPPED | OUTPATIENT
Start: 2023-12-28 | End: 2024-03-05 | Stop reason: SDUPTHER

## 2024-01-12 ENCOUNTER — APPOINTMENT (OUTPATIENT)
Dept: PRIMARY CARE | Facility: CLINIC | Age: 29
End: 2024-01-12
Payer: COMMERCIAL

## 2024-03-05 ENCOUNTER — OFFICE VISIT (OUTPATIENT)
Dept: PRIMARY CARE | Facility: CLINIC | Age: 29
End: 2024-03-05
Payer: COMMERCIAL

## 2024-03-05 VITALS
OXYGEN SATURATION: 100 % | HEIGHT: 65 IN | SYSTOLIC BLOOD PRESSURE: 142 MMHG | BODY MASS INDEX: 37.47 KG/M2 | TEMPERATURE: 97.9 F | HEART RATE: 85 BPM | DIASTOLIC BLOOD PRESSURE: 93 MMHG | WEIGHT: 224.9 LBS

## 2024-03-05 DIAGNOSIS — R42 DIZZINESS: Primary | ICD-10-CM

## 2024-03-05 DIAGNOSIS — F41.9 ANXIETY: ICD-10-CM

## 2024-03-05 DIAGNOSIS — I10 HYPERTENSION, UNSPECIFIED TYPE: ICD-10-CM

## 2024-03-05 DIAGNOSIS — R76.11 POSITIVE TUBERCULIN TEST: ICD-10-CM

## 2024-03-05 PROCEDURE — 1036F TOBACCO NON-USER: CPT

## 2024-03-05 PROCEDURE — 3080F DIAST BP >= 90 MM HG: CPT

## 2024-03-05 PROCEDURE — 99214 OFFICE O/P EST MOD 30 MIN: CPT

## 2024-03-05 PROCEDURE — 3077F SYST BP >= 140 MM HG: CPT

## 2024-03-05 RX ORDER — PAROXETINE 10 MG/1
20 TABLET, FILM COATED ORAL DAILY
Qty: 30 TABLET | Refills: 5 | Status: SHIPPED | OUTPATIENT
Start: 2024-03-05 | End: 2025-03-05

## 2024-03-05 RX ORDER — ISONIAZID 300 MG/1
900 TABLET ORAL
Qty: 12 TABLET | Refills: 1 | Status: SHIPPED | OUTPATIENT
Start: 2024-03-05 | End: 2024-05-09 | Stop reason: SDUPTHER

## 2024-03-05 RX ORDER — RIFAPENTINE 150 MG/1
TABLET, FILM COATED ORAL
Qty: 72 TABLET | Refills: 0 | Status: SHIPPED | OUTPATIENT
Start: 2024-03-05

## 2024-03-05 RX ORDER — AMLODIPINE BESYLATE 5 MG/1
5 TABLET ORAL DAILY
Qty: 90 TABLET | Refills: 3 | Status: SHIPPED | OUTPATIENT
Start: 2024-03-05 | End: 2025-03-05

## 2024-03-05 ASSESSMENT — ENCOUNTER SYMPTOMS
FREQUENCY: 0
VOMITING: 0
PALPITATIONS: 0
HEADACHES: 0
RHINORRHEA: 0
JOINT SWELLING: 0
EYE DISCHARGE: 0
COUGH: 0
WHEEZING: 0
LIGHT-HEADEDNESS: 0
MYALGIAS: 0
NAUSEA: 0
FEVER: 0
WOUND: 0
SLEEP DISTURBANCE: 0
UNEXPECTED WEIGHT CHANGE: 0
APPETITE CHANGE: 0
DIZZINESS: 0
DEPRESSION: 1
SHORTNESS OF BREATH: 0
CONSTIPATION: 0
ABDOMINAL PAIN: 0
DYSURIA: 0
CHILLS: 0
EYE ITCHING: 0
DIARRHEA: 0
CONFUSION: 0
HEMATURIA: 0

## 2024-03-05 ASSESSMENT — PAIN SCALES - GENERAL: PAINLEVEL: 0-NO PAIN

## 2024-03-05 NOTE — PROGRESS NOTES
I saw and evaluated the patient. I personally obtained the key and critical portions of the history and physical exam or was physically present for key and critical portions performed by the resident/fellow. I reviewed the resident/fellow's documentation and discussed the patient with the resident/fellow. I agree with the resident/fellow's medical decision making as documented in the note.    Jackelin Graves MD

## 2024-03-05 NOTE — PROGRESS NOTES
"Subjective   Patient ID: Vita Oconnor is a 28 y.o. female who presents for Dizziness (Request Rx change ).    HPI     #Dizziness   -Feeling dizzy randomly, like during washing dishes or moving about cleaning the kitchen. Feel, faint or lightheaded  -sometimes feeling during times with high anxiety  -Can not tell if it's due to changing positions. Was concerned it was linked to her blood pressure or anxiety. She has been taking amlodipine daily.   -drink 5 bottles of water 40 oz  Denies room spinning, CP, palpitations, SOB, URI symptoms,or  heavy menstrual periods    #Anxiety  Medications: paroxetine 10mg   Would like to increase medication  Need counselor    #[facility] for treatment of latent TB   Was prescribed isoniazid 300mg, Priftin 150mg but never took because she was pregnant at the time and never refilled.     Review of Systems   Constitutional:  Negative for appetite change, chills, fever and unexpected weight change.   HENT:  Negative for congestion, ear discharge, rhinorrhea and sneezing.    Eyes:  Negative for discharge, itching and visual disturbance.   Respiratory:  Negative for cough, shortness of breath and wheezing.    Cardiovascular:  Negative for chest pain, palpitations and leg swelling.   Gastrointestinal:  Negative for abdominal pain, constipation, diarrhea, nausea and vomiting.   Endocrine: Negative for cold intolerance and heat intolerance.   Genitourinary:  Negative for dysuria, frequency, hematuria and urgency.   Musculoskeletal:  Negative for joint swelling and myalgias.   Skin:  Negative for rash and wound.   Neurological:  Negative for dizziness, light-headedness and headaches.   Psychiatric/Behavioral:  Negative for confusion, sleep disturbance and suicidal ideas.        Objective   BP (!) 142/93 (BP Location: Left arm, Patient Position: Sitting, BP Cuff Size: Adult)   Pulse 85   Temp 36.6 °C (97.9 °F) (Temporal)   Ht 1.651 m (5' 5\")   Wt 102 kg (224 lb 14.4 oz)   SpO2 100%   BMI 37.43 " kg/m²     Physical Exam  Constitutional:       General: She is not in acute distress.     Appearance: She is obese.   HENT:      Head: Normocephalic and atraumatic.      Right Ear: Tympanic membrane and ear canal normal. There is no impacted cerumen.      Left Ear: Tympanic membrane and ear canal normal. There is no impacted cerumen.      Nose: Nose normal.      Mouth/Throat:      Mouth: Mucous membranes are dry.   Eyes:      Conjunctiva/sclera: Conjunctivae normal.   Cardiovascular:      Rate and Rhythm: Normal rate and regular rhythm.      Heart sounds: No murmur heard.  Pulmonary:      Effort: Pulmonary effort is normal. No respiratory distress.      Breath sounds: Normal breath sounds. No wheezing.   Abdominal:      General: There is no distension.      Palpations: Abdomen is soft.      Tenderness: There is no abdominal tenderness.   Musculoskeletal:         General: Normal range of motion.      Cervical back: Normal range of motion.      Right lower leg: No edema.      Left lower leg: No edema.   Skin:     General: Skin is warm and dry.   Neurological:      General: No focal deficit present.      Mental Status: She is alert.   Psychiatric:         Mood and Affect: Mood normal.         Behavior: Behavior normal.         Assessment/Plan   Vita Oconnor is a 28 y.o. female who presents for dizziness and anxiety.   Diagnoses and all orders for this visit:  Dizziness  Ddx: hypovolemia, vs drug induced (amlodipine) psychogenic, less likely cardiac or endocrine causes         - Denies CP, SOB, room spinning, recent illness, heavy menstrual periods  -     CBC; Future  - Encouraged to drink 1.5-2L water daily   -  Keep Diary log of symptoms   Anxiety  -     PARoxetine (Paxil) 10 mg tablet; Take 2 tablets (20 mg) by mouth once daily.  -Gave list of mental health counselor   -Fu in 1 month on mood  Hypertension, unspecified type  -     amLODIPine (Norvasc) 5 mg tablet; Take 1 tablet (5 mg) by mouth once daily.  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  -     Priftin 150 mg tablet; Take 6 tablets by mouth once weekly.  -treatment for 3 months duration  - Encouraged to discuss with Pharmacist regarding interaction with birth control and if need to supplement  Other orders  -     Follow Up In Primary Care; Future in 1-2 months to follow up      Discussed with Dr. Star Poole,   PGY2

## 2024-03-05 NOTE — PATIENT INSTRUCTIONS
It was so great to see you today Vita Oconnor  . Today we discussed:    -Get blood work to check hemoglobin  -Repeat chest xray since have not been taking medication  -Refilled latent TB treatment you take once weekly  -Increased paroxetine to 20 mg daily    Call [phone]  For Care if you need to schedule appointment with specialist or images.  IF any labs were ordered today, I will CALL if labs are ABNORMAL. If labs are NORMAL then I will comment on MyChart and mail results to you.    Follow up in       You were see by:    Dr. Heather Poole D.O.  [facility]   Phone: [phone]

## 2024-04-08 ENCOUNTER — APPOINTMENT (OUTPATIENT)
Dept: PRIMARY CARE | Facility: CLINIC | Age: 29
End: 2024-04-08
Payer: COMMERCIAL

## 2024-05-06 ENCOUNTER — APPOINTMENT (OUTPATIENT)
Dept: PRIMARY CARE | Facility: CLINIC | Age: 29
End: 2024-05-06
Payer: COMMERCIAL

## 2024-05-09 ENCOUNTER — OFFICE VISIT (OUTPATIENT)
Dept: PRIMARY CARE | Facility: CLINIC | Age: 29
End: 2024-05-09
Payer: COMMERCIAL

## 2024-05-09 VITALS
WEIGHT: 223 LBS | TEMPERATURE: 96.9 F | DIASTOLIC BLOOD PRESSURE: 86 MMHG | SYSTOLIC BLOOD PRESSURE: 133 MMHG | HEIGHT: 65 IN | BODY MASS INDEX: 37.15 KG/M2 | HEART RATE: 91 BPM | OXYGEN SATURATION: 99 %

## 2024-05-09 DIAGNOSIS — R76.11 POSITIVE TUBERCULIN TEST: ICD-10-CM

## 2024-05-09 PROCEDURE — 99214 OFFICE O/P EST MOD 30 MIN: CPT

## 2024-05-09 PROCEDURE — 1036F TOBACCO NON-USER: CPT

## 2024-05-09 RX ORDER — RIFAMPIN 300 MG/1
600 CAPSULE ORAL DAILY
Qty: 180 CAPSULE | Refills: 0 | Status: SHIPPED | OUTPATIENT
Start: 2024-05-09 | End: 2024-08-07

## 2024-05-09 RX ORDER — PYRIDOXINE HCL (VITAMIN B6) 25 MG
25 TABLET ORAL DAILY
Qty: 90 TABLET | Refills: 0 | Status: SHIPPED | OUTPATIENT
Start: 2024-05-09 | End: 2024-08-07

## 2024-05-09 RX ORDER — ISONIAZID 300 MG/1
900 TABLET ORAL
Qty: 36 TABLET | Refills: 0 | Status: SHIPPED | OUTPATIENT
Start: 2024-05-12 | End: 2024-07-29

## 2024-05-09 ASSESSMENT — PAIN SCALES - GENERAL: PAINLEVEL: 0-NO PAIN

## 2024-05-09 ASSESSMENT — ENCOUNTER SYMPTOMS: DEPRESSION: 0

## 2024-05-09 NOTE — PROGRESS NOTES
"Subjective   Patient ID: Vita Oconnor is a 28 y.o. female who presents for follow-up for her latent tuberculosis diagnosis. Vita reports that she took her isoniazid as prescribed for 4 weeks and then stopped. She was unable to fill the Priftin prescription so she did not take any of this medication. Patient also reports that she no longer takes her Paxil as it makes her feel worse, she reports feeling better since stopping. Pt has been regularly taking her amlodipine.     Review of Systems   All other systems reviewed and are negative.      Objective   /86 (BP Location: Right arm, Patient Position: Sitting)   Pulse 91   Temp 36.1 °C (96.9 °F)   Ht 1.651 m (5' 5\")   Wt 101 kg (223 lb)   SpO2 99%   BMI 37.11 kg/m²     Physical Exam  Constitutional:       Appearance: Normal appearance.   Pulmonary:      Effort: Pulmonary effort is normal.   Neurological:      Mental Status: She is alert and oriented to person, place, and time.   Psychiatric:         Mood and Affect: Mood normal.         Assessment/Plan   Problem List Items Addressed This Visit    None  Visit Diagnoses         Codes    Positive tuberculin test     R76.11    Relevant Medications    rifAMPin (Rifadin) 300 mg capsule    isoniazid (Nydrazid) 300 mg tablet (Start on 5/12/2024)    pyridoxine (Vitamin B-6) 25 mg tablet    Other Relevant Orders    Comprehensive metabolic panel          Patient counseled on her medication regiment including written instruction. RTC 3 months.     Jocelynn Burris, MS3     "

## 2024-05-09 NOTE — PROGRESS NOTES
Precepting Note  Vita Oconnor  [id number]    Patient was seen in [facility] residency clinic today as part of a multidisciplinary teaching team. I participated in this patient's care as a iza precepting physician.    Agreeable with plan as discussed with resident Heather Poole DO and attending provider, Dr. Mills.      Lexi Mcanlly MD  Family Medicine, PGY-3

## 2024-05-09 NOTE — PATIENT INSTRUCTIONS
It was so great to see you today Vita Oconnor  . Today we discussed:    -Isoniazid 900mg once every Sunday for 12 weeks (total for 36 pills)  -Rifampin 600mg daily for 12 weeks (90 days)  -take to B6 vitamin  -After you complete medication, get Chest Xray in Radiology on 5th floor and blood work    Call [phone]  For Care if you need to schedule appointment with specialist or images.  IF any labs were ordered today, I will CALL if labs are ABNORMAL. If labs are NORMAL then I will comment on MyChart and mail results to you.    Follow up in       You were see by:    Dr. Heather Poole D.O.  [facility]   Phone: [phone]

## 2024-05-09 NOTE — PROGRESS NOTES
"Subjective   Patient ID: Vita Oconnor is a 28 y.o. female who presents for latent TB follow-up      HPI     Positive tuberculin test  First noted at [facility] for treatment of latent TB   Was prescribed isoniazid 300mg, Priftin 150mg but never took because she was pregnant at the time and never refilled.   -Last visit in  clinic 3/5/24 and the following was ordered:  -     isoniazid (Nydrazid) 300 mg tablet; Take 3 tablets (900 mg) by mouth 1 (one) time per week.  -     Priftin (rifapentine) 150 mg tablet; Take 6 tablets by mouth once weekly.  -Patient took isoniazid for 4 weeks then ran out.  Never picked up Priftin since not approved by insurance.  Denies any symptoms from isoniazid, SOB, numbness, tingling.        Objective   /86 (BP Location: Right arm, Patient Position: Sitting)   Pulse 91   Temp 36.1 °C (96.9 °F)   Ht 1.651 m (5' 5\")   Wt 101 kg (223 lb)   SpO2 99%   BMI 37.11 kg/m²     Physical Exam  Constitutional:       General: She is not in acute distress.  HENT:      Head: Normocephalic and atraumatic.      Mouth/Throat:      Mouth: Mucous membranes are moist.   Eyes:      Conjunctiva/sclera: Conjunctivae normal.   Pulmonary:      Effort: Pulmonary effort is normal. No respiratory distress.      Breath sounds: Normal breath sounds. No wheezing or rhonchi.   Skin:     General: Skin is warm and dry.   Neurological:      General: No focal deficit present.      Mental Status: She is alert.   Psychiatric:         Mood and Affect: Mood normal.         Behavior: Behavior normal.           Assessment/Plan   Vita Oconnor is a 28 y.o. female who presents for latent TB follow-up.  Patient reports 4 weeks of isoniazid monotherapy only.  Patient unable to fill rifapentine due to insurance coverage.  Will restart latent TB regimen from beginning.  Discussed at length the medication treatment plan over 12 weeks and provided written documentation.  Patient to follow-up after treatment for blood work and repeat " chest x-ray. Patient provided verbal understanding and if she has further questions to call the clinic.   Diagnoses and all orders for this visit:  Positive tuberculin test  -     rifAMPin (Rifadin) 300 mg capsule; Take 2 capsules (600 mg) by mouth once daily.  -     isoniazid (Nydrazid) 300 mg tablet; Take 3 tablets (900 mg) by mouth 1 (one) time per week for 12 doses.  -     Comprehensive metabolic panel; Future  -     pyridoxine (Vitamin B-6) 25 mg tablet; Take 1 tablet (25 mg) by mouth once daily.  - Encouraged to discuss with Pharmacist regarding interaction with birth control and if need to supplement.   Other orders  -     Follow Up In Primary Care     RTC in 12 weeks for follow on completion of latent TB tx & lab results.     Discussed with Dr. Gene Poole,   PGY2

## 2024-05-17 NOTE — PROGRESS NOTES
I saw and evaluated the patient. I personally obtained the key and critical portions of the history and physical exam or was physically present for key and critical portions performed by the resident/fellow. I reviewed the resident/fellow's documentation and discussed the patient with the resident/fellow. I agree with the resident/fellow's medical decision making as documented in the note.    Marcia Mills MD

## 2024-05-17 NOTE — PROGRESS NOTES
I saw and evaluated the patient. I personally obtained the key and critical portions of the history and physical exam or was physically present for key and critical portions performed by the resident/fellow. I reviewed the resident/fellow's documentation and discussed the patient with the resident/fellow. I agree with the resident/fellow's medical decision making as documented in the note with the exception/addition of the following:  ON 5/17 I sent a followup QReca! message to MS. Oconnor, asking if she got both medications, as one had in the past required prior authorization for insurance coverage.  I stated that she should let us know, and that it is important to start treatment with both medicines at the same time.   Marcia Mills MD

## 2024-09-19 ENCOUNTER — APPOINTMENT (OUTPATIENT)
Dept: OBSTETRICS AND GYNECOLOGY | Facility: HOSPITAL | Age: 29
End: 2024-09-19
Payer: COMMERCIAL

## 2024-09-27 ENCOUNTER — APPOINTMENT (OUTPATIENT)
Dept: PRIMARY CARE | Facility: CLINIC | Age: 29
End: 2024-09-27
Payer: COMMERCIAL

## 2024-10-16 ENCOUNTER — HOSPITAL ENCOUNTER (EMERGENCY)
Facility: HOSPITAL | Age: 29
Discharge: HOME | End: 2024-10-17
Payer: COMMERCIAL

## 2024-10-16 VITALS
BODY MASS INDEX: 35.49 KG/M2 | DIASTOLIC BLOOD PRESSURE: 88 MMHG | OXYGEN SATURATION: 100 % | WEIGHT: 213 LBS | RESPIRATION RATE: 16 BRPM | HEIGHT: 65 IN | HEART RATE: 106 BPM | TEMPERATURE: 97.7 F | SYSTOLIC BLOOD PRESSURE: 154 MMHG

## 2024-10-16 DIAGNOSIS — B37.31 CANDIDAL VULVOVAGINITIS: Primary | ICD-10-CM

## 2024-10-16 DIAGNOSIS — O23.599 BACTERIAL VAGINOSIS IN PREGNANCY (HHS-HCC): ICD-10-CM

## 2024-10-16 DIAGNOSIS — B96.89 BACTERIAL VAGINOSIS IN PREGNANCY (HHS-HCC): ICD-10-CM

## 2024-10-16 PROCEDURE — 99283 EMERGENCY DEPT VISIT LOW MDM: CPT

## 2024-10-16 PROCEDURE — 87491 CHLMYD TRACH DNA AMP PROBE: CPT

## 2024-10-16 PROCEDURE — 87210 SMEAR WET MOUNT SALINE/INK: CPT

## 2024-10-16 PROCEDURE — 99284 EMERGENCY DEPT VISIT MOD MDM: CPT

## 2024-10-16 PROCEDURE — 87661 TRICHOMONAS VAGINALIS AMPLIF: CPT

## 2024-10-16 ASSESSMENT — COLUMBIA-SUICIDE SEVERITY RATING SCALE - C-SSRS
1. IN THE PAST MONTH, HAVE YOU WISHED YOU WERE DEAD OR WISHED YOU COULD GO TO SLEEP AND NOT WAKE UP?: NO
6. HAVE YOU EVER DONE ANYTHING, STARTED TO DO ANYTHING, OR PREPARED TO DO ANYTHING TO END YOUR LIFE?: NO
2. HAVE YOU ACTUALLY HAD ANY THOUGHTS OF KILLING YOURSELF?: NO

## 2024-10-16 ASSESSMENT — PAIN SCALES - GENERAL: PAINLEVEL_OUTOF10: 0 - NO PAIN

## 2024-10-16 ASSESSMENT — PAIN - FUNCTIONAL ASSESSMENT: PAIN_FUNCTIONAL_ASSESSMENT: 0-10

## 2024-10-17 LAB
C TRACH RRNA SPEC QL NAA+PROBE: NEGATIVE
CLUE CELLS SPEC QL WET PREP: PRESENT
N GONORRHOEA DNA SPEC QL PROBE+SIG AMP: NEGATIVE
T VAGINALIS RRNA SPEC QL NAA+PROBE: NEGATIVE
T VAGINALIS SPEC QL WET PREP: ABNORMAL
TRICHOMONAS REFLEX COMMENT: ABNORMAL
WBC VAG QL WET PREP: ABNORMAL
YEAST VAG QL WET PREP: PRESENT

## 2024-10-17 RX ORDER — ASPIRIN 325 MG
1 TABLET, DELAYED RELEASE (ENTERIC COATED) ORAL NIGHTLY
Qty: 45 G | Refills: 0 | Status: SHIPPED | OUTPATIENT
Start: 2024-10-17 | End: 2024-10-27

## 2024-10-17 RX ORDER — METRONIDAZOLE 500 MG/1
500 TABLET ORAL 2 TIMES DAILY
Qty: 14 TABLET | Refills: 0 | Status: SHIPPED | OUTPATIENT
Start: 2024-10-17 | End: 2024-10-24

## 2024-10-17 NOTE — ED PROVIDER NOTES
"HPI   Chief Complaint   Patient presents with    Vaginitis/Bacterial Vaginosis       Patient is a 29-year-old female presenting for vaginal itching and irritation.  Reports 3 days of symptoms and believes that she may have a yeast infection.  Endorsing odor, labial irritation and clear discharge.  Of note, she believes that she may be 4 weeks pregnant though is planning on obtaining a medical .  Denies any concern for STD though \"you can check just in case\".  Denies any dysuria or hematuria, no back pain, flank pain, fever or chills or nausea.              Patient History   Past Medical History:   Diagnosis Date    Chlamydial infection, unspecified     Chlamydia    Encounter for supervision of normal first pregnancy, unspecified trimester ([facility]) 2014    Primigravida    Encounter for supervision of other normal pregnancy, unspecified trimester ([facility]) 2014    Pregnancy, subsequent    Hypertension     Other conditions influencing health status     H/O pregnancy    Other specified health status     Last menstrual period (LMP) > 10 days ago    Other stressful life events affecting family and household     Teenage parent    Personal history of other endocrine, nutritional and metabolic disease     History of obesity    Type O blood, Rh positive     Blood type O+     No past surgical history on file.  Family History   Problem Relation Name Age of Onset    No Known Problems Mother      No Known Problems Father       Social History     Tobacco Use    Smoking status: Never    Smokeless tobacco: Never   Substance Use Topics    Alcohol use: Never    Drug use: Never       Physical Exam   ED Triage Vitals [10/16/24 2235]   Temperature Heart Rate Respirations BP   36.5 °C (97.7 °F) (!) 106 16 154/88      Pulse Ox Temp Source Heart Rate Source Patient Position   100 % Temporal Monitor Sitting      BP Location FiO2 (%)     Left arm --       Physical Exam  Vitals and nursing note reviewed. Exam conducted " with a chaperone present.   Constitutional:       General: She is not in acute distress.     Appearance: Normal appearance. She is not ill-appearing.   HENT:      Head: Normocephalic and atraumatic.      Right Ear: External ear normal.      Left Ear: External ear normal.      Nose: Nose normal. No congestion or rhinorrhea.      Mouth/Throat:      Mouth: Mucous membranes are moist.      Pharynx: Oropharynx is clear. No oropharyngeal exudate or posterior oropharyngeal erythema.   Eyes:      Extraocular Movements: Extraocular movements intact.      Conjunctiva/sclera: Conjunctivae normal.      Pupils: Pupils are equal, round, and reactive to light.   Cardiovascular:      Rate and Rhythm: Normal rate and regular rhythm.      Heart sounds: Normal heart sounds.   Pulmonary:      Effort: No accessory muscle usage or respiratory distress.      Breath sounds: Normal breath sounds. No wheezing, rhonchi or rales.   Abdominal:      General: Abdomen is flat. Bowel sounds are normal. There is no distension.      Palpations: Abdomen is soft.      Tenderness: There is no abdominal tenderness. There is no right CVA tenderness or left CVA tenderness.   Genitourinary:     Labia:         Right: No rash or lesion.         Left: No rash or lesion.       Vagina: Vaginal discharge and tenderness present. No erythema, bleeding or lesions.      Cervix: No cervical motion tenderness, discharge or cervical bleeding.      Uterus: Normal.       Adnexa:         Right: No mass or tenderness.          Left: No mass or tenderness.        Rectum: Normal.      Comments: Candida infection to external labia bilaterally with irritation to touch  Musculoskeletal:         General: No swelling or deformity. Normal range of motion.      Cervical back: Normal range of motion and neck supple.      Right lower leg: No edema.      Left lower leg: No edema.   Skin:     General: Skin is warm and dry.      Capillary Refill: Capillary refill takes less than 2  "seconds.   Neurological:      General: No focal deficit present.      Mental Status: She is alert and oriented to person, place, and time.      GCS: GCS eye subscore is 4. GCS verbal subscore is 5. GCS motor subscore is 6.      Cranial Nerves: Cranial nerves 2-12 are intact.      Sensory: No sensory deficit.      Motor: Motor function is intact. No weakness.   Psychiatric:         Mood and Affect: Mood and affect normal.         Speech: Speech normal.         Behavior: Behavior normal. Behavior is cooperative.           ED Course & MDM   Diagnoses as of 10/17/24 0009   Candidal vulvovaginitis   Bacterial vaginosis in pregnancy ([facility])                 No data recorded     Mariam Coma Scale Score: 15 (10/16/24 2238 : Altagracia Ames RN)                           Medical Decision Making  29-year-old female presenting today for vaginal discharge and irritation.  My exam, labia with Candida infection noted with erythema and irritation to touch.  On speculum exam has vaginal discharge that is also tender.  No CMT, no bilateral adnexal or mass.  On external exam, abdomen nontender, no CVA tenderness.  Afebrile.  Does appear that she has a Candida infection.  Patient did leave prior to her swab coming back if she \"could not wait an hour while being here\".  I was able to contact the patient with wet prep results of BV and yeast but she did provide pharmacy aware she wanted medication sent, so I did send an Flagyl and clotrimazole cream to treat.  She has lower concern for STI so empiric treatment for this was deferred.        Procedure  Procedures     Abby Murdock PA-C  10/17/24 0010       Abby Murdock PA-C  10/17/24 0015    "

## 2024-10-17 NOTE — ED TRIAGE NOTES
Patients reports she has a yeast infection. States she has vaginal itching and clear discharge. Also states that she is about a month pregnant. Had a positive pregnancy test at home.

## 2024-12-17 ENCOUNTER — OFFICE VISIT (OUTPATIENT)
Dept: OBSTETRICS AND GYNECOLOGY | Facility: HOSPITAL | Age: 29
End: 2024-12-17
Payer: COMMERCIAL

## 2024-12-17 VITALS
DIASTOLIC BLOOD PRESSURE: 92 MMHG | WEIGHT: 224 LBS | SYSTOLIC BLOOD PRESSURE: 152 MMHG | BODY MASS INDEX: 37.32 KG/M2 | HEIGHT: 65 IN | HEART RATE: 89 BPM

## 2024-12-17 DIAGNOSIS — Z01.419 WELL WOMAN EXAM: Primary | ICD-10-CM

## 2024-12-17 DIAGNOSIS — Z11.3 ROUTINE SCREENING FOR STI (SEXUALLY TRANSMITTED INFECTION): ICD-10-CM

## 2024-12-17 DIAGNOSIS — Z12.4 SCREENING FOR CERVICAL CANCER: ICD-10-CM

## 2024-12-17 DIAGNOSIS — Z30.41 ENCOUNTER FOR SURVEILLANCE OF CONTRACEPTIVE PILLS: ICD-10-CM

## 2024-12-17 DIAGNOSIS — I10 HYPERTENSION, UNSPECIFIED TYPE: ICD-10-CM

## 2024-12-17 PROCEDURE — 3077F SYST BP >= 140 MM HG: CPT

## 2024-12-17 PROCEDURE — 3080F DIAST BP >= 90 MM HG: CPT

## 2024-12-17 PROCEDURE — 3008F BODY MASS INDEX DOCD: CPT

## 2024-12-17 PROCEDURE — 99385 PREV VISIT NEW AGE 18-39: CPT

## 2024-12-17 PROCEDURE — 87661 TRICHOMONAS VAGINALIS AMPLIF: CPT

## 2024-12-17 PROCEDURE — 87591 N.GONORRHOEAE DNA AMP PROB: CPT

## 2024-12-17 PROCEDURE — 1036F TOBACCO NON-USER: CPT

## 2024-12-17 RX ORDER — NORETHINDRONE 0.35 MG/1
1 TABLET ORAL DAILY
Qty: 84 TABLET | Refills: 3 | Status: SHIPPED | OUTPATIENT
Start: 2024-12-17 | End: 2025-12-17

## 2024-12-17 SDOH — ECONOMIC STABILITY: FOOD INSECURITY: WITHIN THE PAST 12 MONTHS, YOU WORRIED THAT YOUR FOOD WOULD RUN OUT BEFORE YOU GOT MONEY TO BUY MORE.: NEVER TRUE

## 2024-12-17 SDOH — ECONOMIC STABILITY: TRANSPORTATION INSECURITY
IN THE PAST 12 MONTHS, HAS LACK OF TRANSPORTATION KEPT YOU FROM MEETINGS, WORK, OR FROM GETTING THINGS NEEDED FOR DAILY LIVING?: NO

## 2024-12-17 SDOH — ECONOMIC STABILITY: FOOD INSECURITY: WITHIN THE PAST 12 MONTHS, THE FOOD YOU BOUGHT JUST DIDN'T LAST AND YOU DIDN'T HAVE MONEY TO GET MORE.: NEVER TRUE

## 2024-12-17 SDOH — ECONOMIC STABILITY: TRANSPORTATION INSECURITY
IN THE PAST 12 MONTHS, HAS THE LACK OF TRANSPORTATION KEPT YOU FROM MEDICAL APPOINTMENTS OR FROM GETTING MEDICATIONS?: NO

## 2024-12-17 ASSESSMENT — ENCOUNTER SYMPTOMS
OCCASIONAL FEELINGS OF UNSTEADINESS: 0
LOSS OF SENSATION IN FEET: 0
DEPRESSION: 0

## 2024-12-17 ASSESSMENT — PATIENT HEALTH QUESTIONNAIRE - PHQ9
1. LITTLE INTEREST OR PLEASURE IN DOING THINGS: NOT AT ALL
SUM OF ALL RESPONSES TO PHQ9 QUESTIONS 1 & 2: 0
2. FEELING DOWN, DEPRESSED OR HOPELESS: NOT AT ALL

## 2024-12-17 NOTE — PROGRESS NOTES
Assessment/Plan   Problem List Items Addressed This Visit             ICD-10-CM    Well woman exam - Primary Z01.419    Routine screening for STI (sexually transmitted infection) Z11.3    Relevant Orders    HIV 1/2 Antigen/Antibody Screen with Reflex to Confirmation    Hepatitis B Surface Antigen    Hepatitis C Antibody    Syphilis Screen with Reflex    Screening for cervical cancer Z12.4    Relevant Orders    THINPREP PAP TEST (>30)    Encounter for surveillance of contraceptive pills Z30.41    Relevant Medications    norethindrone (Micronor) 0.35 mg tablet    Hypertension I10    Relevant Medications    norethindrone (Micronor) 0.35 mg tablet   Explained to patient that, with IAB and resuming OCP post, hormones may have been attempting to regulate, but have not quite returned to normal yet; patient verbalized understanding.  If she has any concern for pregnancy, she may take a home UPT at any point; patient verbalized understanding.    Due to patient being on medication for pre-existing hypertension, having a hypertensive reading at today's appointment even though she took her medication at 0800 this morning, and she is unable to be seen by PCP until January, concerned for further aggravation of unstable hypertension with OCP.  Explained to patient that the estrogen component to OCP may further increase blood pressure, and increase the risk of blood clots; patient verbalized understanding.  Patient agreeable to switch to POP permanently or until hypertension is better managed.  Patient to start POP on Sunday, and educated patient to abstain or use backup method for 1 week after beginning new birth control to prevent pregnancy.  Patient verbalized understanding.     Lorri Del Valle, ALVIN-TEAGAN     Subjective   Vita Gricelda Oconnor is a 29 y.o. female who is here for a routine exam. Periods were regular prior to IAB in November.  Patient had menses in December, but also had breakthrough bleeding with the current  "OCP she was instructed to resume post IAB. Dysmenorrhea:none. Cyclic symptoms include none. No intermenstrual bleeding, spotting, or discharge.  Concerns regarding whether her \"body changed\" post IAB regarding menses flow.  Also desires refills of OCP.    ROS      BP (!) 152/92 (BP Location: Right arm, Patient Position: Sitting, BP Cuff Size: Large adult long)   Pulse 89   Ht 1.651 m (5' 5\")   Wt 102 kg (224 lb)   LMP  (LMP Unknown) Comment: induced  in november  Breastfeeding No   BMI 37.28 kg/m²     General:   Alert and oriented x 3   Heart:  Thyroid: Regular rate, rhythm  Euthyroid, normal shape and size   Lungs:  Breast: Clear to auscultation bilaterally  Symmetrical, no skin changes/nipple discharge, redness, tenderness, no masses palpated bilaterally   Abdomen: Soft, non tender   Vulva: EGBUS normal   Vagina: Pink, normal discharge   Cervix: No CMT   Uterus: Normal shape, size   Adnexa: NT bilaterally       Thank you for coming to see me for your visit today and most importantly thank you for having a preventative visit.  If lab work was sent, you will see your test result via [employer]  Any prescriptions will be sent electronically to your pharmacy listed    I look forward to seeing you next year for your health care needs.  Please remember:   Sleep is important - make it a priority for at least 6 hours per night!   Exercise such as walking for 20 minutes per day is beneficial to your physical and mental health   Healthy diets can be expensive -- if you every feel like you are struggling to afford healthy food, please let me know as we have a very good program called [employer] that is available to you   Decrease alcohol and tobacco.  A goal of less than 7 alcoholic drinks per week is recommended for risk reduction of breast and colon cancer by 38%!    Be well!  Lorri    "

## 2024-12-18 LAB
C TRACH RRNA SPEC QL NAA+PROBE: NEGATIVE
N GONORRHOEA DNA SPEC QL PROBE+SIG AMP: NEGATIVE
T VAGINALIS RRNA SPEC QL NAA+PROBE: NEGATIVE

## 2024-12-30 LAB
CYTOLOGY CMNT CVX/VAG CYTO-IMP: NORMAL
HPV HR 12 DNA GENITAL QL NAA+PROBE: NEGATIVE
HPV HR GENOTYPES PNL CVX NAA+PROBE: NEGATIVE
HPV16 DNA SPEC QL NAA+PROBE: NEGATIVE
HPV18 DNA SPEC QL NAA+PROBE: NEGATIVE
LAB AP CONTRACEPTIVE HISTORY: NORMAL
LAB AP HPV GENOTYPE QUESTION: YES
LAB AP HPV HR: NORMAL
LAB AP PAP ADDITIONAL TESTS: NORMAL
LABORATORY COMMENT REPORT: NORMAL
PATH REPORT.TOTAL CANCER: NORMAL

## 2025-01-20 ENCOUNTER — APPOINTMENT (OUTPATIENT)
Dept: PRIMARY CARE | Facility: CLINIC | Age: 30
End: 2025-01-20
Payer: COMMERCIAL

## 2025-01-20 VITALS
WEIGHT: 230 LBS | HEIGHT: 65 IN | DIASTOLIC BLOOD PRESSURE: 92 MMHG | OXYGEN SATURATION: 99 % | BODY MASS INDEX: 38.32 KG/M2 | HEART RATE: 88 BPM | SYSTOLIC BLOOD PRESSURE: 136 MMHG

## 2025-01-20 DIAGNOSIS — F41.9 ANXIETY: Primary | ICD-10-CM

## 2025-01-20 DIAGNOSIS — I10 HYPERTENSION, UNSPECIFIED TYPE: ICD-10-CM

## 2025-01-20 DIAGNOSIS — G47.9 SLEEP DISTURBANCE: ICD-10-CM

## 2025-01-20 DIAGNOSIS — Z22.7 LATENT TUBERCULOSIS BY SKIN TEST: ICD-10-CM

## 2025-01-20 PROCEDURE — 3075F SYST BP GE 130 - 139MM HG: CPT

## 2025-01-20 PROCEDURE — 99214 OFFICE O/P EST MOD 30 MIN: CPT

## 2025-01-20 PROCEDURE — 3008F BODY MASS INDEX DOCD: CPT

## 2025-01-20 PROCEDURE — 99214 OFFICE O/P EST MOD 30 MIN: CPT | Mod: GC

## 2025-01-20 PROCEDURE — 3080F DIAST BP >= 90 MM HG: CPT

## 2025-01-20 RX ORDER — AMLODIPINE BESYLATE 5 MG/1
5 TABLET ORAL DAILY
Qty: 90 TABLET | Refills: 3 | Status: SHIPPED | OUTPATIENT
Start: 2025-01-20 | End: 2026-01-20

## 2025-01-20 RX ORDER — MELATONIN 3 MG
3 CAPSULE ORAL NIGHTLY PRN
Qty: 30 CAPSULE | Refills: 1 | Status: SHIPPED | OUTPATIENT
Start: 2025-01-20 | End: 2025-02-19

## 2025-01-20 ASSESSMENT — PATIENT HEALTH QUESTIONNAIRE - PHQ9
SUM OF ALL RESPONSES TO PHQ9 QUESTIONS 1 AND 2: 0
2. FEELING DOWN, DEPRESSED OR HOPELESS: NOT AT ALL
1. LITTLE INTEREST OR PLEASURE IN DOING THINGS: NOT AT ALL

## 2025-01-20 ASSESSMENT — PAIN SCALES - GENERAL: PAINLEVEL_OUTOF10: 0-NO PAIN

## 2025-01-20 ASSESSMENT — ENCOUNTER SYMPTOMS
OCCASIONAL FEELINGS OF UNSTEADINESS: 0
DEPRESSION: 0
LOSS OF SENSATION IN FEET: 0

## 2025-01-20 NOTE — PROGRESS NOTES
"Subjective   Patient ID: Vita Oconnor is a 29 y.o. female who presents for Follow-up     HPI       #Contraception Follow up  - Patient recently started on progestin only pill after recent OBGYN [facility] Exam with PAP d/t elevated blood pressure.  - patient has been taking medication daily at same time for 1 month and recently had menstural cycle. She reports cycle was shorter than normal and wanted to know if that was normal since she is typically very regular.    #Sleep disturbance iso anxiety  - patient reports poor sleep d/t racing thoughts  - asked about sleep medication      #Latent TB  - Background history: First noted at [facility] for treatment of latent TB   Was prescribed isoniazid 300mg, Priftin 150mg but never took because she was pregnant at the time and never refilled.   Last visit in  clinic 3/5/24 and the following was ordered:  -    isoniazid (Nydrazid) 300 mg tablet; Take 3 tablets (900 mg) by mouth 1 (one) time per week.  -    Priftin (rifapentine) 150 mg tablet; Take 6 tablets by mouth once weekly.  Patient took isoniazid for 4 weeks then ran out.  Never picked up Priftin since not approved by insurance.    Since dual therapy is needed for treatment, plan to restart treatment with rifampin and isoniazid. Discussed at length the medication treatment plan over 12 weeks and provided written documentation.  Patient to follow-up after treatment for blood work and repeat chest x-ray. Patient provided verbal understanding and if she has further questions to call the clinic.       Today patient reports she never started medication and changed her number, hence why the clinic was unable to reach her about ongoing treatment.       Review of Systems  12pt reviewed negative     Objective   BP (!) 136/92   Pulse 88   Ht 1.651 m (5' 5\")   Wt 104 kg (230 lb)   SpO2 99%   BMI 38.27 kg/m²     Physical Exam  Constitutional:       General: She is not in acute distress.  HENT:      Head: Normocephalic and " atraumatic.      Nose: Nose normal.      Mouth/Throat:      Mouth: Mucous membranes are moist.   Eyes:      Conjunctiva/sclera: Conjunctivae normal.   Cardiovascular:      Rate and Rhythm: Normal rate and regular rhythm.      Heart sounds: No murmur heard.  Pulmonary:      Effort: Pulmonary effort is normal.      Breath sounds: Normal breath sounds.   Musculoskeletal:      Right lower leg: No edema.      Left lower leg: No edema.   Skin:     General: Skin is warm and dry.      Capillary Refill: Capillary refill takes 2 to 3 seconds.   Neurological:      General: No focal deficit present.      Mental Status: She is alert.   Psychiatric:         Mood and Affect: Mood normal.         Behavior: Behavior normal.         Assessment/Plan   Vita Oconnor is a 29 y.o. female who presents for follow   Diagnoses and all orders for this visit:  #Anxiety  #Sleep disturbance  -     melatonin 3 mg capsule; Take 3 mg by mouth as needed at bedtime (for sleep).  -     Reviewed Sleep hygiene techniques  -     Provided list of mental health counselors including Diana Hogan  -     Follow up in 2 months  -     Can consider referral to Sleep psychology for CBT  Latent tuberculosis by skin test       -      Referred and provided number for patient to follow up with [facility] TB clinic with extensive team to report, treat, and investigate all cases of TB in [facility] in hopes patient does not continue to be untreated given mutliple attempts to manage at  clinic.         -      Follow up in 1-2 months to check with patient if she has scheduled with TB clinic.   Hypertension, unspecified type, controlled  -     Refilled amLODIPine (Norvasc) 5 mg tablet; Take 1 tablet (5 mg) by mouth once daily.    Other orders  -     Follow Up In Primary Care; Future     RTC in 1-2 for virtual visit to follow up on sleep disturbance iso anxiety and latent TB treatment.       Discussed with Dr. Patrica Poole, DO  PGY3

## 2025-01-20 NOTE — PATIENT INSTRUCTIONS
It was so great to see you today Vita Oconnor  . Today we discussed:    -progressive muscle relaxation  -      Call [phone]  For Care if you need to schedule appointment with specialist or images.  IF any labs were ordered today, I will CALL if labs are ABNORMAL. If labs are NORMAL then I will comment on MyChart and mail results to you.    Follow up in       You were see by:    Dr. Heather Poole D.O.  [facility]   Phone: [phone]

## 2025-03-28 ENCOUNTER — TELEMEDICINE (OUTPATIENT)
Facility: HOSPITAL | Age: 30
End: 2025-03-28
Payer: COMMERCIAL

## 2025-03-28 DIAGNOSIS — F41.9 ANXIETY: ICD-10-CM

## 2025-03-28 DIAGNOSIS — R53.83 FATIGUE, UNSPECIFIED TYPE: Primary | ICD-10-CM

## 2025-03-28 RX ORDER — PAROXETINE 10 MG/1
20 TABLET, FILM COATED ORAL DAILY
Qty: 30 TABLET | Refills: 5 | Status: SHIPPED | OUTPATIENT
Start: 2025-03-28 | End: 2026-03-28

## 2025-03-28 NOTE — PROGRESS NOTES
I reviewed the resident/fellow's documentation and discussed the patient with the resident/fellow. I agree with the resident/fellow's medical decision making as documented in the note. I discussed but did not see the patient consistent with the Primary Care Exception.       Alissa Loza MD

## 2025-03-28 NOTE — PROGRESS NOTES
Subjective   Patient ID: Vita Oconnor is a 29 y.o. 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    Physical Exam  Limited physical exam for this telehealth visit. Sound in no acute distress. Speaking in full sentences without apparent shortness of breath. Normal affect, linear thought process, appropriate answers to questions.    Assessment/Plan   Vita Oconnor is a 29 y.o. female who presents for phone visit follow up on Mood & Latent TB  Diagnoses and all orders for this visit:  Fatigue, unspecified type  -     Tsh With Reflex To Free T4 If Abnormal; Future  -     Vitamin D 25-Hydroxy,Total (for eval of Vitamin D levels); Future  -     Vitamin B12; Future  Anxiety  Depresion  -     GAD7: 6 pts mild anxiety, PHQ9: Mild depression  -      PARoxetine (Paxil) 10 mg tablet; Take 2 tablets (20 mg) by mouth once daily.        -     Recommend [facility] for virtual appointment       -      Discussed lifestyle modifications such as optimizing nutrition, walking daily, and affirmation meditation apps       -      Follow up in 1-2 months after restarting SSRI  Latent tuberculosis by skin test         -    Patient to call [facility] ([phone]) again to see if they have a earlier appointment the May, otherwise will schedule when their is availability .      RTC in 1-2 to follow up on mood and review lab  work    Discussed with Dr. Dago Poole, DO  PGY3

## 2025-03-31 ENCOUNTER — HOSPITAL ENCOUNTER (EMERGENCY)
Facility: HOSPITAL | Age: 30
Discharge: HOME | End: 2025-03-31
Payer: COMMERCIAL

## 2025-03-31 VITALS
WEIGHT: 200 LBS | TEMPERATURE: 98.1 F | HEIGHT: 65 IN | SYSTOLIC BLOOD PRESSURE: 138 MMHG | OXYGEN SATURATION: 100 % | HEART RATE: 100 BPM | DIASTOLIC BLOOD PRESSURE: 94 MMHG | RESPIRATION RATE: 16 BRPM | BODY MASS INDEX: 33.32 KG/M2

## 2025-03-31 DIAGNOSIS — M67.432 GANGLION CYST OF DORSUM OF LEFT WRIST: Primary | ICD-10-CM

## 2025-03-31 PROCEDURE — 2500000001 HC RX 250 WO HCPCS SELF ADMINISTERED DRUGS (ALT 637 FOR MEDICARE OP): Mod: SE

## 2025-03-31 PROCEDURE — 99283 EMERGENCY DEPT VISIT LOW MDM: CPT

## 2025-03-31 RX ORDER — IBUPROFEN 400 MG/1
400 TABLET ORAL ONCE
Status: COMPLETED | OUTPATIENT
Start: 2025-03-31 | End: 2025-03-31

## 2025-03-31 RX ORDER — IBUPROFEN 600 MG/1
600 TABLET ORAL EVERY 6 HOURS PRN
Qty: 28 TABLET | Refills: 0 | Status: SHIPPED | OUTPATIENT
Start: 2025-03-31 | End: 2025-04-07

## 2025-03-31 RX ADMIN — IBUPROFEN 400 MG: 400 TABLET ORAL at 21:29

## 2025-03-31 ASSESSMENT — PAIN SCALES - GENERAL: PAINLEVEL_OUTOF10: 7

## 2025-03-31 ASSESSMENT — PAIN - FUNCTIONAL ASSESSMENT: PAIN_FUNCTIONAL_ASSESSMENT: 0-10

## 2025-03-31 ASSESSMENT — COLUMBIA-SUICIDE SEVERITY RATING SCALE - C-SSRS
2. HAVE YOU ACTUALLY HAD ANY THOUGHTS OF KILLING YOURSELF?: NO
6. HAVE YOU EVER DONE ANYTHING, STARTED TO DO ANYTHING, OR PREPARED TO DO ANYTHING TO END YOUR LIFE?: NO
1. IN THE PAST MONTH, HAVE YOU WISHED YOU WERE DEAD OR WISHED YOU COULD GO TO SLEEP AND NOT WAKE UP?: NO

## 2025-03-31 ASSESSMENT — PAIN DESCRIPTION - ORIENTATION: ORIENTATION: LEFT

## 2025-03-31 ASSESSMENT — PAIN DESCRIPTION - LOCATION: LOCATION: WRIST

## 2025-03-31 NOTE — Clinical Note
Vita Oconnor was seen and treated in our emergency department on 3/31/2025.  She may return to work on 04/02/2025.       If you have any questions or concerns, please don't hesitate to call.      Dorothy Romero, APRN-CNP

## 2025-04-01 LAB
25(OH)D3+25(OH)D2 SERPL-MCNC: 16 NG/ML (ref 30–100)
TSH SERPL-ACNC: 0.59 MIU/L
VIT B12 SERPL-MCNC: 239 PG/ML (ref 200–1100)

## 2025-04-01 NOTE — ED PROVIDER NOTES
History of Present Illness     History provided by: Patient  Limitations to History: None  External Records Reviewed with Brief Summary: None    HPI:  Vita Oconnor is a 29 y.o. female who reports to the emergency department for concern of cyst to the left wrist.  Patient states she has had the cyst for years, however given her job duties has had head discomfort.  She denies traumatic injury, decreased range of motion, decreased circulation, decreased sensation, open sore wound, fever, chills, shortness of breath or chest pain.    Physical Exam   Triage vitals:  T 36.7 °C (98.1 °F)    BP (!) 138/94  RR 16  O2 100 %      Physical Exam  Vitals and nursing note reviewed.   Constitutional:       General: She is not in acute distress.  HENT:      Head: Normocephalic.      Right Ear: External ear normal.      Left Ear: External ear normal.      Nose: Nose normal.      Mouth/Throat:      Mouth: Mucous membranes are moist.      Pharynx: Oropharynx is clear.   Eyes:      Extraocular Movements: Extraocular movements intact.   Cardiovascular:      Rate and Rhythm: Normal rate and regular rhythm.      Pulses: Normal pulses.   Pulmonary:      Effort: Pulmonary effort is normal.   Musculoskeletal:      Cervical back: Normal range of motion.   Skin:     General: Skin is warm and dry.      Findings: No rash.      Comments: There is a tender nodule to the left posterior wrist, with no surrounding erythema, swelling, warmth or discharge indicating cellulitis.  No fluctuance or induration indicating abscess.   Neurological:      General: No focal deficit present.      Mental Status: She is alert.          Medical Decision Making & ED Course   Medical Decision Makin y.o. female who reports to the emergency department for concern of cyst to the left posterior wrist.    Upon examination, patient is alert to person, place and time. There is a tender nodule to the left posterior wrist, with no surrounding erythema,  swelling, warmth or discharge indicating cellulitis.  No fluctuance or induration indicating abscess.  There is full range of motion to the left wrist.  No obvious deformity. Neurovascularly intact.    Differential diagnoses considered include but are not limited to: Abscess, ganglion cyst, cellulitis     Treated with ibuprofen, home-going with anti-inflammatory and follow-up with hand specialist.  Patient agreed with plan of care, verbalized understanding and left in stable condition.    ED Course:  Diagnoses as of 03/31/25 2123   Ganglion cyst of dorsum of left wrist     Disposition   Discharged    Procedures   Procedures    Patient was seen independently    ALVIN Farrell-CNP  Emergency Medicine     PENELOPE Farrell  03/31/25 2135

## 2025-04-03 DIAGNOSIS — E55.9 VITAMIN D DEFICIENCY: Primary | ICD-10-CM

## 2025-04-03 RX ORDER — ERGOCALCIFEROL 1.25 MG/1
1.25 CAPSULE ORAL WEEKLY
Qty: 12 CAPSULE | Refills: 0 | Status: SHIPPED | OUTPATIENT
Start: 2025-04-03 | End: 2026-04-03

## 2025-04-11 ENCOUNTER — OFFICE VISIT (OUTPATIENT)
Dept: ORTHOPEDIC SURGERY | Facility: HOSPITAL | Age: 30
End: 2025-04-11
Payer: COMMERCIAL

## 2025-04-11 DIAGNOSIS — M67.432 GANGLION CYST OF DORSUM OF LEFT WRIST: Primary | ICD-10-CM

## 2025-04-11 PROCEDURE — 99203 OFFICE O/P NEW LOW 30 MIN: CPT | Performed by: ORTHOPAEDIC SURGERY

## 2025-04-11 PROCEDURE — 99213 OFFICE O/P EST LOW 20 MIN: CPT | Performed by: ORTHOPAEDIC SURGERY

## 2025-04-11 PROCEDURE — 1036F TOBACCO NON-USER: CPT | Performed by: ORTHOPAEDIC SURGERY

## 2025-04-11 NOTE — PROGRESS NOTES
CHIEF COMPLAINT         Left wrist mass    ASSESSMENT + PLAN    Left dorsal wrist ganglion    The benign nature of the ganglion and its waxing and waning size was reviewed, as was the expectation that any aching discomfort will gradually decrease over the next few months.  The options for management were reviewed, including observation, aspiration, or surgical excision, along with the likely success rates of each.    The patient wished to observe for now, which is fine.  I reviewed warning signs, which would suggest a more sinister diagnosis, and should prompt a return to clinic.    Followup with any concerns.        HISTORY OF PRESENT ILLNESS       Patient is a 29 y.o. left-hand dominant female, who presents today for evaluation of a left dorsal wrist mass.  This was first noticed several years ago and has fluctuated in size.  It has been enlarging and more bothersome over the last few months.  No night pain or rest pain, just pain when it is bumped or with full wrist extension and axial load, such as pressing up from a chair.  No popping, clicking, or subjective instability.  No other similar masses elsewhere.    She is not diabetic or hypothyroid.  She does not smoke.      REVIEW OF SYSTEMS       A 30-item multi-system Review Of Systems was obtained on today's intake form.  This was reviewed with the patient and is correct.  The pertinent positives and negatives are listed above.  The form has been scanned separately into the medical record.      PHYSICAL EXAM    Constitutional:    Appears stated age. Well-developed and well-nourished obese female in no acute distress.  Psychiatric:         Pleasant normal mood and affect. Behavior is appropriate for the situation.   Head:                   Normocephalic and atraumatic.  Eyes:                    Pupils are equal and round.  Cardiovascular:  2+ radial and ulnar pulses. Fingers well-perfused.  Respiratory:        Effort normal. No respiratory distress. Speaking in  complete sentences.  Neurologic:       Alert and oriented to person, place, and time.  Skin:                Skin is intact, warm and dry.  Hematologic / Lymphatic:    No lymphedema or lymphangitis.    Extremities / Musculoskeletal:                      Skin of the left hand and wrist is intact with no erythema, ecchymosis, or diffuse swelling.  Normal skin drag and coloration.  Full composite finger flexion extension with full intrinsic plus minus posture.  Symmetric wrist and forearm motion.  There is a 1 cm round soft mass over the left dorsal mid wrist.  This is nontender, nonpulsatile, with no Tinel's sign.  It does transilluminate.  It does not move with the extensor tendons.  Negative Hairston.  Negative midcarpal shift.  Sensation intact to light touch in all distributions.  Capillary refill less than 2 seconds.      IMAGING / LABS / EMGs           None pertinent      Past Medical History:   Diagnosis Date    Chlamydial infection, unspecified     Chlamydia    Encounter for supervision of normal first pregnancy, unspecified trimester ([facility]) 05/23/2014    Primigravida    Encounter for supervision of other normal pregnancy, unspecified trimester ([facility]) 09/18/2014    Pregnancy, subsequent    Hypertension     Other conditions influencing health status     H/O pregnancy    Other specified health status     Last menstrual period (LMP) > 10 days ago    Other stressful life events affecting family and household     Teenage parent    Personal history of other endocrine, nutritional and metabolic disease     History of obesity    Type O blood, Rh positive     Blood type O+       Medication Documentation Review Audit       Reviewed by Emmy Rene MA (Medical Assistant) on 01/20/25 at 1051      Medication Order Taking? Sig Documenting Provider Last Dose Status   amLODIPine (Norvasc) 5 mg tablet [id number] Yes Take 1 tablet (5 mg) by mouth once daily. Heather Poole, DO  Active   clobetasol (Temovate) 0.05 %  cream [id number] Yes 1 Application Historical Provider, MD Not Taking Active   ibuprofen 600 mg tablet [id number] Yes Take by mouth every 6 hours. Historical Provider, MD  Active   norethindrone (Micronor) 0.35 mg tablet [id number] Yes Take 1 tablet (0.35 mg) over 28 days by mouth once daily. Lorri Del Valle, APRN-CNM  Active   PARoxetine (Paxil) 10 mg tablet [id number] No Take 2 tablets (20 mg) by mouth once daily.   Patient not taking: Reported on 1/20/2025    Heather Poole, DO Not Taking Active                    Allergies   Allergen Reactions    Amoxicillin Unknown    Benadryl [Diphenhydramine Hcl] Hives       Social History     Socioeconomic History    Marital status: Single     Spouse name: Not on file    Number of children: Not on file    Years of education: Not on file    Highest education level: Not on file   Occupational History    Not on file   Tobacco Use    Smoking status: Never    Smokeless tobacco: Never   Vaping Use    Vaping status: Never Used   Substance and Sexual Activity    Alcohol use: Never    Drug use: Never    Sexual activity: Not on file   Other Topics Concern    Not on file   Social History Narrative    Not on file     Social Drivers of Health     Financial Resource Strain: Not on File (8/23/2023)    [facility] from [facility]    Financial Resource Strain     Financial Resource Strain: 0   Food Insecurity: No Food Insecurity (12/17/2024)    Hunger Vital Sign     Worried About Running Out of Food in the Last Year: Never true     Ran Out of Food in the Last Year: Never true   Transportation Needs: No Transportation Needs (12/17/2024)    PRAPARE - Transportation     Lack of Transportation (Medical): No     Lack of Transportation (Non-Medical): No   Physical Activity: Not on File (8/23/2023)    Received from [facility]    Physical Activity     Physical Activity: 0   Stress: Not on File (8/23/2023)    Received from [facility]    Stress     Stress: 0   Social Connections: Not on File  (9/15/2024)    Received from [facility]    Social Connections     Connectedness: 0   Intimate Partner Violence: Not At Risk (12/17/2024)    Humiliation, Afraid, Rape, and Kick questionnaire     Fear of Current or Ex-Partner: No     Emotionally Abused: No     Physically Abused: No     Sexually Abused: No       No past surgical history on file.      Electronically Signed      TRANG Kothari MD      Orthopaedic Hand Surgery      [phone]

## 2025-04-11 NOTE — LETTER
April 12, 2025     Heather Poole DO  [id number]  [facility] [address]    Patient: Vita Oconnor   YOB: 1995   Date of Visit: 4/11/2025       Dear Dr. Heather Poole DO:    Thank you for referring Vita Oconnor to me for evaluation. Below are my notes for this consultation.  If you have questions, please do not hesitate to call me. I look forward to following your patient along with you.       Sincerely,     Joel Kothari MD      CC: No Recipients  ______________________________________________________________________________________    CHIEF COMPLAINT         Left wrist mass    ASSESSMENT + PLAN    Left dorsal wrist ganglion    The benign nature of the ganglion and its waxing and waning size was reviewed, as was the expectation that any aching discomfort will gradually decrease over the next few months.  The options for management were reviewed, including observation, aspiration, or surgical excision, along with the likely success rates of each.    The patient wished to observe for now, which is fine.  I reviewed warning signs, which would suggest a more sinister diagnosis, and should prompt a return to clinic.    Followup with any concerns.        HISTORY OF PRESENT ILLNESS       Patient is a 29 y.o. left-hand dominant female, who presents today for evaluation of a left dorsal wrist mass.  This was first noticed several years ago and has fluctuated in size.  It has been enlarging and more bothersome over the last few months.  No night pain or rest pain, just pain when it is bumped or with full wrist extension and axial load, such as pressing up from a chair.  No popping, clicking, or subjective instability.  No other similar masses elsewhere.    She is not diabetic or hypothyroid.  She does not smoke.      REVIEW OF SYSTEMS       A 30-item multi-system Review Of Systems was obtained on today's intake form.  This was reviewed with the patient and is correct.  The pertinent  positives and negatives are listed above.  The form has been scanned separately into the medical record.      PHYSICAL EXAM    Constitutional:    Appears stated age. Well-developed and well-nourished obese female in no acute distress.  Psychiatric:         Pleasant normal mood and affect. Behavior is appropriate for the situation.   Head:                   Normocephalic and atraumatic.  Eyes:                    Pupils are equal and round.  Cardiovascular:  2+ radial and ulnar pulses. Fingers well-perfused.  Respiratory:        Effort normal. No respiratory distress. Speaking in complete sentences.  Neurologic:       Alert and oriented to person, place, and time.  Skin:                Skin is intact, warm and dry.  Hematologic / Lymphatic:    No lymphedema or lymphangitis.    Extremities / Musculoskeletal:                      Skin of the left hand and wrist is intact with no erythema, ecchymosis, or diffuse swelling.  Normal skin drag and coloration.  Full composite finger flexion extension with full intrinsic plus minus posture.  Symmetric wrist and forearm motion.  There is a 1 cm round soft mass over the left dorsal mid wrist.  This is nontender, nonpulsatile, with no Tinel's sign.  It does transilluminate.  It does not move with the extensor tendons.  Negative Hairston.  Negative midcarpal shift.  Sensation intact to light touch in all distributions.  Capillary refill less than 2 seconds.      IMAGING / LABS / EMGs           None pertinent      Past Medical History:   Diagnosis Date   • Chlamydial infection, unspecified     Chlamydia   • Encounter for supervision of normal first pregnancy, unspecified trimester ([facility]) 05/23/2014    Primigravida   • Encounter for supervision of other normal pregnancy, unspecified trimester ([facility]) 09/18/2014    Pregnancy, subsequent   • Hypertension    • Other conditions influencing health status     H/O pregnancy   • Other specified health status     Last menstrual period  (LMP) > 10 days ago   • Other stressful life events affecting family and household     Teenage parent   • Personal history of other endocrine, nutritional and metabolic disease     History of obesity   • Type O blood, Rh positive     Blood type O+       Medication Documentation Review Audit       Reviewed by Emmy Rene MA (Medical Assistant) on 01/20/25 at 1051      Medication Order Taking? Sig Documenting Provider Last Dose Status   amLODIPine (Norvasc) 5 mg tablet [id number] Yes Take 1 tablet (5 mg) by mouth once daily. Heather Poole, DO  Active   clobetasol (Temovate) 0.05 % cream [id number] Yes 1 Application Historical Provider, MD Not Taking Active   ibuprofen 600 mg tablet [id number] Yes Take by mouth every 6 hours. Historical Provider, MD  Active   norethindrone (Micronor) 0.35 mg tablet [id number] Yes Take 1 tablet (0.35 mg) over 28 days by mouth once daily. ALVIN Serrato-TEAGAN  Active   PARoxetine (Paxil) 10 mg tablet [id number] No Take 2 tablets (20 mg) by mouth once daily.   Patient not taking: Reported on 1/20/2025    Heather Poole, DO Not Taking Active                    Allergies   Allergen Reactions   • Amoxicillin Unknown   • Benadryl [Diphenhydramine Hcl] Hives       Social History     Socioeconomic History   • Marital status: Single     Spouse name: Not on file   • Number of children: Not on file   • Years of education: Not on file   • Highest education level: Not on file   Occupational History   • Not on file   Tobacco Use   • Smoking status: Never   • Smokeless tobacco: Never   Vaping Use   • Vaping status: Never Used   Substance and Sexual Activity   • Alcohol use: Never   • Drug use: Never   • Sexual activity: Not on file   Other Topics Concern   • Not on file   Social History Narrative   • Not on file     Social Drivers of Health     Financial Resource Strain: Not on File (8/23/2023)    Received from [facility]    Financial Resource Strain    • Financial Resource Strain:  0   Food Insecurity: No Food Insecurity (12/17/2024)    Hunger Vital Sign    • Worried About Running Out of Food in the Last Year: Never true    • Ran Out of Food in the Last Year: Never true   Transportation Needs: No Transportation Needs (12/17/2024)    PRAPARE - Transportation    • Lack of Transportation (Medical): No    • Lack of Transportation (Non-Medical): No   Physical Activity: Not on File (8/23/2023)    Received from [facility]    Physical Activity    • Physical Activity: 0   Stress: Not on File (8/23/2023)    Received from [facility]    Stress    • Stress: 0   Social Connections: Not on File (9/15/2024)    Received from [facility]    Social Connections    • Connectedness: 0   Intimate Partner Violence: Not At Risk (12/17/2024)    Humiliation, Afraid, Rape, and Kick questionnaire    • Fear of Current or Ex-Partner: No    • Emotionally Abused: No    • Physically Abused: No    • Sexually Abused: No       No past surgical history on file.      Electronically Signed      TRANG Kothari MD      Orthopaedic Hand Surgery      [phone]

## 2025-04-12 PROBLEM — M67.432 GANGLION CYST OF DORSUM OF LEFT WRIST: Status: ACTIVE | Noted: 2025-04-12

## 2025-04-15 DIAGNOSIS — M67.432 GANGLION OF LEFT WRIST: ICD-10-CM

## 2025-04-18 ENCOUNTER — HOSPITAL ENCOUNTER (OUTPATIENT)
Facility: HOSPITAL | Age: 30
Setting detail: OUTPATIENT SURGERY
End: 2025-04-18
Attending: ORTHOPAEDIC SURGERY | Admitting: ORTHOPAEDIC SURGERY
Payer: COMMERCIAL

## 2025-04-18 PROBLEM — M67.432 GANGLION OF LEFT WRIST: Status: ACTIVE | Noted: 2025-04-15

## 2025-04-23 ENCOUNTER — APPOINTMENT (OUTPATIENT)
Dept: PREADMISSION TESTING | Facility: HOSPITAL | Age: 30
End: 2025-04-23
Payer: COMMERCIAL

## 2025-04-24 ENCOUNTER — APPOINTMENT (OUTPATIENT)
Dept: PREADMISSION TESTING | Facility: HOSPITAL | Age: 30
End: 2025-04-24
Payer: COMMERCIAL

## 2025-04-25 ENCOUNTER — PRE-ADMISSION TESTING (OUTPATIENT)
Dept: PREADMISSION TESTING | Facility: HOSPITAL | Age: 30
End: 2025-04-25
Payer: COMMERCIAL

## 2025-04-29 ENCOUNTER — APPOINTMENT (OUTPATIENT)
Dept: PREADMISSION TESTING | Facility: HOSPITAL | Age: 30
End: 2025-04-29
Payer: COMMERCIAL

## 2025-05-03 RX ORDER — SODIUM CHLORIDE, SODIUM LACTATE, POTASSIUM CHLORIDE, CALCIUM CHLORIDE 600; 310; 30; 20 MG/100ML; MG/100ML; MG/100ML; MG/100ML
50 INJECTION, SOLUTION INTRAVENOUS CONTINUOUS
OUTPATIENT
Start: 2025-05-05 | End: 2025-05-06

## 2025-05-03 RX ORDER — CEFAZOLIN SODIUM 2 G/100ML
2 INJECTION, SOLUTION INTRAVENOUS ONCE
OUTPATIENT
Start: 2025-05-03 | End: 2025-05-03

## 2025-05-05 ENCOUNTER — ANESTHESIA EVENT (OUTPATIENT)
Dept: OPERATING ROOM | Facility: HOSPITAL | Age: 30
End: 2025-05-05
Payer: COMMERCIAL

## 2025-05-05 ENCOUNTER — ANESTHESIA (OUTPATIENT)
Dept: OPERATING ROOM | Facility: HOSPITAL | Age: 30
End: 2025-05-05
Payer: COMMERCIAL

## 2025-05-05 RX ORDER — OXYCODONE HYDROCHLORIDE 5 MG/1
10 TABLET ORAL EVERY 4 HOURS PRN
Refills: 0 | OUTPATIENT
Start: 2025-05-05

## 2025-05-05 RX ORDER — ACETAMINOPHEN 325 MG/1
650 TABLET ORAL EVERY 4 HOURS PRN
OUTPATIENT
Start: 2025-05-05

## 2025-05-05 RX ORDER — ONDANSETRON HYDROCHLORIDE 2 MG/ML
4 INJECTION, SOLUTION INTRAVENOUS ONCE AS NEEDED
OUTPATIENT
Start: 2025-05-05

## 2025-05-05 RX ORDER — SODIUM CHLORIDE, SODIUM LACTATE, POTASSIUM CHLORIDE, CALCIUM CHLORIDE 600; 310; 30; 20 MG/100ML; MG/100ML; MG/100ML; MG/100ML
50 INJECTION, SOLUTION INTRAVENOUS CONTINUOUS
OUTPATIENT
Start: 2025-05-05 | End: 2025-05-06

## 2025-05-05 RX ORDER — FENTANYL CITRATE 50 UG/ML
50 INJECTION, SOLUTION INTRAMUSCULAR; INTRAVENOUS EVERY 5 MIN PRN
Refills: 0 | OUTPATIENT
Start: 2025-05-05

## 2025-05-05 RX ORDER — ALBUTEROL SULFATE 0.83 MG/ML
2.5 SOLUTION RESPIRATORY (INHALATION) ONCE
OUTPATIENT
Start: 2025-05-05 | End: 2025-05-05

## 2025-05-05 RX ORDER — FENTANYL CITRATE 50 UG/ML
25 INJECTION, SOLUTION INTRAMUSCULAR; INTRAVENOUS EVERY 5 MIN PRN
Refills: 0 | OUTPATIENT
Start: 2025-05-05

## 2025-05-05 RX ORDER — OXYCODONE HYDROCHLORIDE 5 MG/1
5 TABLET ORAL EVERY 4 HOURS PRN
Refills: 0 | OUTPATIENT
Start: 2025-05-05

## 2025-05-05 NOTE — ANESTHESIA PREPROCEDURE EVALUATION
Patient: Vita Oconnor    Procedure Information       Date/Time: 05/05/25 0900    Procedure: Excision Left Dorsal Wrist Ganglion (Left: Wrist)    Location: [facility] / [facility]    Surgeons: Joel Kothari MD            Relevant Problems   Anesthesia (within normal limits)      Cardiac   (+) Hypertension      Pulmonary (within normal limits)      Neuro (within normal limits)      GI (within normal limits)      /Renal (within normal limits)      Liver (within normal limits)      Endocrine   (+) Obesity      Hematology (within normal limits)      Musculoskeletal (within normal limits)      HEENT (within normal limits)      ID (within normal limits)      Skin (within normal limits)      GYN (within normal limits)     Past Medical History:   Diagnosis Date    Chlamydial infection, unspecified     Chlamydia    Encounter for supervision of normal first pregnancy, unspecified trimester ([facility]) 05/23/2014    Primigravida    Encounter for supervision of other normal pregnancy, unspecified trimester ([facility]) 09/18/2014    Pregnancy, subsequent    Hypertension     Other conditions influencing health status     H/O pregnancy    Other specified health status     Last menstrual period (LMP) > 10 days ago    Other stressful life events affecting family and household     Teenage parent    Personal history of other endocrine, nutritional and metabolic disease     History of obesity    Type O blood, Rh positive     Blood type O+     No past surgical history on file.    Allergies   Allergen Reactions    Amoxicillin Unknown    Benadryl [Diphenhydramine Hcl] Hives         Clinical information reviewed:                   NPO Detail:  No data recorded     Physical Exam    Airway  Mallampati: II  TM distance: >3 FB  Neck ROM: full  Mouth opening: 3 or more finger widths     Cardiovascular    Dental    Pulmonary    Abdominal            Anesthesia Plan    History of general anesthesia?: yes  History of complications of  general anesthesia?: no    ASA 3     MAC     intravenous induction   Postoperative pain plan includes opioids.  Anesthetic plan and risks discussed with patient.  Use of blood products discussed with patient who.    Plan discussed with CRNA and CAA.